# Patient Record
Sex: MALE | Race: WHITE | NOT HISPANIC OR LATINO | Employment: FULL TIME | URBAN - METROPOLITAN AREA
[De-identification: names, ages, dates, MRNs, and addresses within clinical notes are randomized per-mention and may not be internally consistent; named-entity substitution may affect disease eponyms.]

---

## 2021-07-01 ENCOUNTER — PATIENT MESSAGE (OUTPATIENT)
Dept: ADMINISTRATIVE | Facility: OTHER | Age: 31
End: 2021-07-01

## 2021-07-06 ENCOUNTER — OFFICE VISIT (OUTPATIENT)
Dept: PRIMARY CARE CLINIC | Facility: CLINIC | Age: 31
End: 2021-07-06
Payer: COMMERCIAL

## 2021-07-06 VITALS
WEIGHT: 190.69 LBS | OXYGEN SATURATION: 97 % | HEIGHT: 66 IN | SYSTOLIC BLOOD PRESSURE: 123 MMHG | RESPIRATION RATE: 18 BRPM | BODY MASS INDEX: 30.65 KG/M2 | DIASTOLIC BLOOD PRESSURE: 80 MMHG | HEART RATE: 96 BPM

## 2021-07-06 DIAGNOSIS — Z00.00 NORMAL PHYSICAL EXAM, ROUTINE: Primary | ICD-10-CM

## 2021-07-06 DIAGNOSIS — Z13.220 SCREENING FOR LIPOID DISORDERS: ICD-10-CM

## 2021-07-06 DIAGNOSIS — K21.9 GASTROESOPHAGEAL REFLUX DISEASE, UNSPECIFIED WHETHER ESOPHAGITIS PRESENT: ICD-10-CM

## 2021-07-06 PROCEDURE — 99395 PR PREVENTIVE VISIT,EST,18-39: ICD-10-PCS | Mod: S$GLB,,, | Performed by: INTERNAL MEDICINE

## 2021-07-06 PROCEDURE — 99999 PR PBB SHADOW E&M-EST. PATIENT-LVL III: CPT | Mod: PBBFAC,,, | Performed by: INTERNAL MEDICINE

## 2021-07-06 PROCEDURE — 1126F AMNT PAIN NOTED NONE PRSNT: CPT | Mod: S$GLB,,, | Performed by: INTERNAL MEDICINE

## 2021-07-06 PROCEDURE — 3008F PR BODY MASS INDEX (BMI) DOCUMENTED: ICD-10-PCS | Mod: CPTII,S$GLB,, | Performed by: INTERNAL MEDICINE

## 2021-07-06 PROCEDURE — 3008F BODY MASS INDEX DOCD: CPT | Mod: CPTII,S$GLB,, | Performed by: INTERNAL MEDICINE

## 2021-07-06 PROCEDURE — 99395 PREV VISIT EST AGE 18-39: CPT | Mod: S$GLB,,, | Performed by: INTERNAL MEDICINE

## 2021-07-06 PROCEDURE — 99999 PR PBB SHADOW E&M-EST. PATIENT-LVL III: ICD-10-PCS | Mod: PBBFAC,,, | Performed by: INTERNAL MEDICINE

## 2021-07-06 PROCEDURE — 1126F PR PAIN SEVERITY QUANTIFIED, NO PAIN PRESENT: ICD-10-PCS | Mod: S$GLB,,, | Performed by: INTERNAL MEDICINE

## 2021-07-06 RX ORDER — OMEPRAZOLE 20 MG/1
20 CAPSULE, DELAYED RELEASE ORAL DAILY
Qty: 30 CAPSULE | Refills: 0 | Status: SHIPPED | OUTPATIENT
Start: 2021-07-06 | End: 2021-08-05

## 2021-07-09 ENCOUNTER — IMMUNIZATION (OUTPATIENT)
Dept: INTERNAL MEDICINE | Facility: CLINIC | Age: 31
End: 2021-07-09
Payer: COMMERCIAL

## 2021-07-09 DIAGNOSIS — Z23 NEED FOR VACCINATION: Primary | ICD-10-CM

## 2021-07-09 PROCEDURE — 0031A COVID-19,VECTOR-NR,RS-AD26,PF,0.5 ML DOSE VACCINE (JANSSEN): CPT | Mod: PBBFAC | Performed by: INTERNAL MEDICINE

## 2021-11-14 ENCOUNTER — PATIENT MESSAGE (OUTPATIENT)
Dept: PRIMARY CARE CLINIC | Facility: CLINIC | Age: 31
End: 2021-11-14
Payer: COMMERCIAL

## 2021-11-15 ENCOUNTER — OFFICE VISIT (OUTPATIENT)
Dept: PRIMARY CARE CLINIC | Facility: CLINIC | Age: 31
End: 2021-11-15
Payer: COMMERCIAL

## 2021-11-15 VITALS
SYSTOLIC BLOOD PRESSURE: 118 MMHG | WEIGHT: 196.44 LBS | HEART RATE: 75 BPM | HEIGHT: 66 IN | BODY MASS INDEX: 31.57 KG/M2 | TEMPERATURE: 98 F | RESPIRATION RATE: 18 BRPM | OXYGEN SATURATION: 98 % | DIASTOLIC BLOOD PRESSURE: 70 MMHG

## 2021-11-15 DIAGNOSIS — J00 RHINOPHARYNGITIS: Primary | ICD-10-CM

## 2021-11-15 PROCEDURE — 99213 OFFICE O/P EST LOW 20 MIN: CPT | Mod: S$GLB,,, | Performed by: INTERNAL MEDICINE

## 2021-11-15 PROCEDURE — 1159F PR MEDICATION LIST DOCUMENTED IN MEDICAL RECORD: ICD-10-PCS | Mod: CPTII,S$GLB,, | Performed by: INTERNAL MEDICINE

## 2021-11-15 PROCEDURE — 3078F DIAST BP <80 MM HG: CPT | Mod: CPTII,S$GLB,, | Performed by: INTERNAL MEDICINE

## 2021-11-15 PROCEDURE — 99999 PR PBB SHADOW E&M-EST. PATIENT-LVL III: ICD-10-PCS | Mod: PBBFAC,,, | Performed by: INTERNAL MEDICINE

## 2021-11-15 PROCEDURE — 3078F PR MOST RECENT DIASTOLIC BLOOD PRESSURE < 80 MM HG: ICD-10-PCS | Mod: CPTII,S$GLB,, | Performed by: INTERNAL MEDICINE

## 2021-11-15 PROCEDURE — 99213 PR OFFICE/OUTPT VISIT, EST, LEVL III, 20-29 MIN: ICD-10-PCS | Mod: S$GLB,,, | Performed by: INTERNAL MEDICINE

## 2021-11-15 PROCEDURE — 3074F SYST BP LT 130 MM HG: CPT | Mod: CPTII,S$GLB,, | Performed by: INTERNAL MEDICINE

## 2021-11-15 PROCEDURE — 1159F MED LIST DOCD IN RCRD: CPT | Mod: CPTII,S$GLB,, | Performed by: INTERNAL MEDICINE

## 2021-11-15 PROCEDURE — 3008F PR BODY MASS INDEX (BMI) DOCUMENTED: ICD-10-PCS | Mod: CPTII,S$GLB,, | Performed by: INTERNAL MEDICINE

## 2021-11-15 PROCEDURE — 3074F PR MOST RECENT SYSTOLIC BLOOD PRESSURE < 130 MM HG: ICD-10-PCS | Mod: CPTII,S$GLB,, | Performed by: INTERNAL MEDICINE

## 2021-11-15 PROCEDURE — 99999 PR PBB SHADOW E&M-EST. PATIENT-LVL III: CPT | Mod: PBBFAC,,, | Performed by: INTERNAL MEDICINE

## 2021-11-15 PROCEDURE — 3008F BODY MASS INDEX DOCD: CPT | Mod: CPTII,S$GLB,, | Performed by: INTERNAL MEDICINE

## 2022-07-07 ENCOUNTER — TELEPHONE (OUTPATIENT)
Dept: PRIMARY CARE CLINIC | Facility: CLINIC | Age: 32
End: 2022-07-07

## 2022-07-07 ENCOUNTER — PATIENT MESSAGE (OUTPATIENT)
Dept: PRIMARY CARE CLINIC | Facility: CLINIC | Age: 32
End: 2022-07-07
Payer: COMMERCIAL

## 2022-07-07 NOTE — TELEPHONE ENCOUNTER
Appointment Request From: Faheem Shankar      With Provider: Yarely Echavarria MD [[facility] - Primary Care]      Preferred Date Range: 7/12/2022 - 7/12/2022      Preferred Times: Any Time      Reason for visit: Last week I passed out for around 20 minutes and have not felt well since. I have been for a COVID test today and it was negative.      Comments:   Possible reasons for passing out.      Responded to pt to get more info and to find out if he went to ED after episode

## 2022-07-07 NOTE — TELEPHONE ENCOUNTER
----- Message from Adela Gee sent at 7/7/2022 12:51 PM CDT -----  Contact: pt [phone]  Patient was discharged from ED 7/7/22. ED suggested orders for Holter monitor and or ECHO. Please call and advise.    Thank you and have a great day.

## 2022-07-07 NOTE — TELEPHONE ENCOUNTER
lov 11/15/21  You do have one opening tomorrow but I am unsure if you would like to see pt or think he needs to go to ED for eval

## 2022-07-07 NOTE — TELEPHONE ENCOUNTER
"Ed note states"  He was discharged and instructed to follow-up with his primary care physician to have them schedule a Holter monitor and/or an echocardiogram"   Ed f/u appt scheduled for Monday afternoon  "

## 2022-07-11 ENCOUNTER — HOSPITAL ENCOUNTER (OUTPATIENT)
Dept: RADIOLOGY | Facility: HOSPITAL | Age: 32
Discharge: HOME | End: 2022-07-11
Attending: INTERNAL MEDICINE
Payer: COMMERCIAL

## 2022-07-11 ENCOUNTER — OFFICE VISIT (OUTPATIENT)
Dept: PRIMARY CARE CLINIC | Facility: CLINIC | Age: 32
End: 2022-07-11
Payer: COMMERCIAL

## 2022-07-11 VITALS
SYSTOLIC BLOOD PRESSURE: 100 MMHG | BODY MASS INDEX: 30.45 KG/M2 | HEIGHT: 67 IN | WEIGHT: 194 LBS | OXYGEN SATURATION: 98 % | TEMPERATURE: 99 F | HEART RATE: 76 BPM | DIASTOLIC BLOOD PRESSURE: 68 MMHG | RESPIRATION RATE: 16 BRPM

## 2022-07-11 DIAGNOSIS — D50.9 MICROCYTIC ANEMIA: ICD-10-CM

## 2022-07-11 DIAGNOSIS — F41.9 ANXIETY: ICD-10-CM

## 2022-07-11 DIAGNOSIS — R55 SYNCOPE, UNSPECIFIED SYNCOPE TYPE: ICD-10-CM

## 2022-07-11 DIAGNOSIS — R06.09 DOE (DYSPNEA ON EXERTION): Primary | ICD-10-CM

## 2022-07-11 DIAGNOSIS — R73.9 HYPERGLYCEMIA: ICD-10-CM

## 2022-07-11 DIAGNOSIS — R06.09 DOE (DYSPNEA ON EXERTION): ICD-10-CM

## 2022-07-11 DIAGNOSIS — K21.9 GASTROESOPHAGEAL REFLUX DISEASE, UNSPECIFIED WHETHER ESOPHAGITIS PRESENT: ICD-10-CM

## 2022-07-11 PROCEDURE — 3078F PR MOST RECENT DIASTOLIC BLOOD PRESSURE < 80 MM HG: ICD-10-PCS | Mod: CPTII,S$GLB,, | Performed by: INTERNAL MEDICINE

## 2022-07-11 PROCEDURE — 3008F BODY MASS INDEX DOCD: CPT | Mod: CPTII,S$GLB,, | Performed by: INTERNAL MEDICINE

## 2022-07-11 PROCEDURE — 3074F SYST BP LT 130 MM HG: CPT | Mod: CPTII,S$GLB,, | Performed by: INTERNAL MEDICINE

## 2022-07-11 PROCEDURE — 1160F RVW MEDS BY RX/DR IN RCRD: CPT | Mod: CPTII,S$GLB,, | Performed by: INTERNAL MEDICINE

## 2022-07-11 PROCEDURE — 1160F PR REVIEW ALL MEDS BY PRESCRIBER/CLIN PHARMACIST DOCUMENTED: ICD-10-PCS | Mod: CPTII,S$GLB,, | Performed by: INTERNAL MEDICINE

## 2022-07-11 PROCEDURE — 71046 X-RAY EXAM CHEST 2 VIEWS: CPT | Mod: TC,PN

## 2022-07-11 PROCEDURE — 99214 PR OFFICE/OUTPT VISIT, EST, LEVL IV, 30-39 MIN: ICD-10-PCS | Mod: S$GLB,,, | Performed by: INTERNAL MEDICINE

## 2022-07-11 PROCEDURE — 1159F MED LIST DOCD IN RCRD: CPT | Mod: CPTII,S$GLB,, | Performed by: INTERNAL MEDICINE

## 2022-07-11 PROCEDURE — 99999 PR PBB SHADOW E&M-EST. PATIENT-LVL IV: CPT | Mod: PBBFAC,,, | Performed by: INTERNAL MEDICINE

## 2022-07-11 PROCEDURE — 71046 XR CHEST PA AND LATERAL: ICD-10-PCS | Mod: 26,,, | Performed by: RADIOLOGY

## 2022-07-11 PROCEDURE — 99214 OFFICE O/P EST MOD 30 MIN: CPT | Mod: S$GLB,,, | Performed by: INTERNAL MEDICINE

## 2022-07-11 PROCEDURE — 99999 PR PBB SHADOW E&M-EST. PATIENT-LVL IV: ICD-10-PCS | Mod: PBBFAC,,, | Performed by: INTERNAL MEDICINE

## 2022-07-11 PROCEDURE — 3008F PR BODY MASS INDEX (BMI) DOCUMENTED: ICD-10-PCS | Mod: CPTII,S$GLB,, | Performed by: INTERNAL MEDICINE

## 2022-07-11 PROCEDURE — 71046 X-RAY EXAM CHEST 2 VIEWS: CPT | Mod: 26,,, | Performed by: RADIOLOGY

## 2022-07-11 PROCEDURE — 1159F PR MEDICATION LIST DOCUMENTED IN MEDICAL RECORD: ICD-10-PCS | Mod: CPTII,S$GLB,, | Performed by: INTERNAL MEDICINE

## 2022-07-11 PROCEDURE — 3078F DIAST BP <80 MM HG: CPT | Mod: CPTII,S$GLB,, | Performed by: INTERNAL MEDICINE

## 2022-07-11 PROCEDURE — 3074F PR MOST RECENT SYSTOLIC BLOOD PRESSURE < 130 MM HG: ICD-10-PCS | Mod: CPTII,S$GLB,, | Performed by: INTERNAL MEDICINE

## 2022-07-11 NOTE — PROGRESS NOTES
[facility] Note    Chief Complaint      Chief Complaint   Patient presents with    Follow-up     Er visit. [facility]    Shortness of Breath       History of Present Illness      Faheem Shankar is a 31 y.o. M with GERD presents to fu ED visit s/p syncopal episode.   Last visit - 11/15/21    Syncope - He tested neg for COVID at  on 7/6/22. He c/o feeling weak/tired, had a HA, hot and cold, and had an episode of nausea and emesis.  He then went into his barn to feed the horses for approx 10-15mins when he became nauseated and vomited then had a syncopal episode on 6/30/22.  He states that he was on the ground for approximately 15 minutes. Seen in ED -7/7/22 -  He received IVF and fioricet for HA.   He was not orthostatic. He was discharged and instructed to follow-up with his primary care physician to have them schedule a Holter monitor and/or an echocardiogram.  He gets tired and ESTRADA.+snores. No known apnea.      Anxiety/Depression - Work has been stressful. He works in insurance. He plans to fu with a therapist. He goes to the gym 2/wk x 45-60 minutes. He does the treadmill without issue. He has not been since 3 wks ago.     Microcytic anemia - H/H  -12.6/39.8. Pt has thalassemia in family.  Will check iron studies.     GERD - Up and down. He takes Omeprazole prn.     Hyperglycemia - Will check HA1c.     HCM - Flu - none;  Tdap - '15?;  PCV 13 - none;  PVX 23 - none;  Gardasil - none;  COVID - 19 Vaccine (J&J) #1 7/9/21;  Hep C Screen - none - defers;  HIV Screen - none - defers; C-scope - none;  PSA - none; Prev PCP - me at [facility]    Patient Care Team:  Yarely Echavarria MD as PCP - General (Internal Medicine)     Health Maintenance:  Immunization History   Administered Date(s) Administered    COVID-19, vector-nr, rS-Ad26, PF ([place]) 07/09/2021      Health Maintenance   Topic Date Due    Hepatitis C Screening  Never done    Lipid Panel  Never done    TETANUS VACCINE  Never done         Past Medical History:  Past Medical History:   Diagnosis Date    COVID-19 02/2021       Past Surgical History:   has a past surgical history that includes OTHER SURGICAL HISTORY (Right).    Family History:  family history includes Depression in his mother; Diabetes in his maternal grandmother; Diverticulitis in his father; Heart attack (age of onset: 76) in his maternal grandmother; OCD in his mother; Thalassemia in his maternal grandmother, mother, paternal grandmother, and sister.     Social History:  Social History     Tobacco Use    Smoking status: Never Smoker    Smokeless tobacco: Never Used   Substance Use Topics    Alcohol use: Never    Drug use: Never       Review of Systems   Constitutional: Positive for fatigue. Negative for chills, diaphoresis and fever.   HENT: Positive for nasal congestion. Negative for rhinorrhea and sore throat.    Respiratory: Positive for cough and shortness of breath. Negative for chest tightness.         Cough x 8 days - sometimes brings up yellow sputum.    Cardiovascular: Negative for chest pain.   Gastrointestinal: Positive for abdominal pain and diarrhea. Negative for blood in stool, constipation, nausea and vomiting.   Endocrine: Negative for cold intolerance and heat intolerance.   Genitourinary: Negative for difficulty urinating, dysuria and hematuria.   Musculoskeletal: Negative for arthralgias and myalgias.   Neurological: Positive for headaches. Negative for dizziness.        Above left eye - comes and goes.    Psychiatric/Behavioral: Negative for dysphoric mood. The patient is nervous/anxious.         Medications:    Current Outpatient Medications:     multivitamin capsule, Take 1 capsule by mouth once daily., Disp: 1 capsule, Rfl: 0    omeprazole (PRILOSEC) 20 MG capsule, TAKE 1 CAPSULE(20 MG) BY MOUTH EVERY DAY, Disp: 30 capsule, Rfl: 0     Allergies:  Review of patient's allergies indicates:  No Known Allergies    Physical Exam      Vital Signs  Temp: 99  "°F (37.2 °C)  Pulse: 76  Resp: 16  SpO2: 98 %  BP: 100/68  BP Location: Right arm  Pain Score:   1  Height and Weight  Height: 5' 7" (170.2 cm)  Weight: 88 kg (194 lb 0.1 oz)  BSA (Calculated - sq m): 2.04 sq meters  BMI (Calculated): 30.4  Weight in (lb) to have BMI = 25: 159.3             Physical Exam  Vitals reviewed.   Constitutional:       General: He is not in acute distress.     Appearance: Normal appearance. He is not ill-appearing, toxic-appearing or diaphoretic.   HENT:      Head: Normocephalic and atraumatic.      Right Ear: Tympanic membrane normal.      Left Ear: Tympanic membrane normal.   Eyes:      Extraocular Movements: Extraocular movements intact.      Conjunctiva/sclera: Conjunctivae normal.      Pupils: Pupils are equal, round, and reactive to light.   Neck:      Vascular: No carotid bruit.   Cardiovascular:      Rate and Rhythm: Normal rate and regular rhythm.      Pulses: Normal pulses.      Heart sounds: Normal heart sounds. No murmur heard.  Pulmonary:      Effort: No respiratory distress.      Breath sounds: Normal breath sounds. No wheezing.   Abdominal:      General: Bowel sounds are normal. There is no distension.      Palpations: Abdomen is soft.      Tenderness: There is no abdominal tenderness. There is no guarding or rebound.   Musculoskeletal:         General: Normal range of motion.   Skin:     General: Skin is warm and dry.   Neurological:      General: No focal deficit present.      Mental Status: He is alert and oriented to person, place, and time.   Psychiatric:         Mood and Affect: Mood normal.         Behavior: Behavior normal.          Laboratory:  CBC:  Recent Labs   Lab 07/07/22  0938   WBC 8.39   RBC 6.64 H   Hemoglobin 12.6 L   Hematocrit 39.8 L   Platelets 184   MCV 60 L   MCH 19.0 L   MCHC 31.7 L       CMP:  Recent Labs   Lab 07/07/22  0938   Glucose 119 H   Calcium 9.4   Albumin 4.9   Total Protein 8.0   Sodium 140   Potassium 3.7   CO2 29   Chloride 103   BUN 18 "   Creatinine 0.74   Alkaline Phosphatase 57   ALT 30   AST 33   Total Bilirubin 0.9       URINALYSIS:  Recent Labs   Lab 07/07/22  1017   Color, UA Yellow   Specific Gravity, UA >=1.030 A   pH, UA 6.5   Protein, UA 1+ A   Bacteria Negative  Negative   Nitrite, UA Negative   Leukocytes, UA Negative   Urobilinogen, UA 1.0   Hyaline Casts, UA 1  1          Assessment/Plan     Faheem Shankar is a 31 y.o.male with:    ESTRADA (dyspnea on exertion)  -     Echo; Future  -     X-Ray Chest PA And Lateral; Future; Expected date: 07/11/2022  - Check CXR - wnl.  Will check Echo.     Syncope, unspecified syncope type  -     Holter monitor - 48 hour; Future  - He will alert me for any further episodes.  Will check Echo and Holter.     Anxiety  - He plans to fu with a therapist. Pt not interested in pharmacotherapy at this time.     Microcytic anemia  -     Ferritin; Future; Expected date: 07/11/2022  -     Iron and TIBC; Future; Expected date: 07/11/2022  - Suspect thalassemia.  Will check iron studies.     Gastroesophageal reflux disease, unspecified whether esophagitis present  - Cont prn PPI, and reflux prec.     Hyperglycemia  -     Hemoglobin A1C; Future; Expected date: 07/11/2022  - Check HA1c.        Chronic conditions status updated as per HPI.  Other than changes above, cont current medications and maintain follow up with specialists.   Follow up in about 6 weeks (around 8/22/2022) for fu chronic issues or sooner if needed.      Yarely Echavarria MD  [facility]

## 2022-07-12 ENCOUNTER — TELEPHONE (OUTPATIENT)
Dept: PRIMARY CARE CLINIC | Facility: CLINIC | Age: 32
End: 2022-07-12
Payer: COMMERCIAL

## 2022-07-12 DIAGNOSIS — D50.9 MICROCYTIC ANEMIA: ICD-10-CM

## 2022-07-12 DIAGNOSIS — E61.1 IRON DEFICIENCY: Primary | ICD-10-CM

## 2022-07-12 NOTE — TELEPHONE ENCOUNTER
He can if he wants since it is borderline. Otherwise, that is a very low of dose of iron and should not cause any concerning issues other than possible constipation     Dr. COLBERT

## 2022-07-12 NOTE — TELEPHONE ENCOUNTER
I sw pt. No blood or black tarry stools. Pt states his mother has  thalassemia and she was told to take folic acid and not an iron supplement. Pt would like to know if he should still just take the slow fe for now until we confirm  thalassemia with his labs in 8 wks

## 2022-07-12 NOTE — TELEPHONE ENCOUNTER
----- Message from Yarely Echavarria MD sent at 7/12/2022  6:43 AM CDT -----  Please inform pt -  I reviewed your labs.  Your ferritin was normal but Your % saturation was just below normal at 19% which means you may be slightly iron deficient.  I recommend you start an otc iron supplement - slow FE OTC 3 times.wk and we can repeat this level in 8 wks.  I will also order a test to check you for thalassemia.  Please make sure this is not an unexpected expense for you when you go to get it. If it is too expensive to get the thalassemia test we can hold off. Please note that iron supplements can cause constipation and darker stools.  Please alert me if you have any issues tolerating this medication. Have you noticed any blood in your stool or black tarry stool? No further recommendations at this time.    Dr. COLBERT

## 2022-07-29 ENCOUNTER — HOSPITAL ENCOUNTER (OUTPATIENT)
Dept: CARDIOLOGY | Facility: HOSPITAL | Age: 32
Discharge: HOME | End: 2022-07-29
Attending: INTERNAL MEDICINE
Payer: COMMERCIAL

## 2022-07-29 VITALS
HEART RATE: 75 BPM | BODY MASS INDEX: 30.45 KG/M2 | HEIGHT: 67 IN | DIASTOLIC BLOOD PRESSURE: 70 MMHG | SYSTOLIC BLOOD PRESSURE: 110 MMHG | WEIGHT: 194 LBS

## 2022-07-29 DIAGNOSIS — R55 SYNCOPE, UNSPECIFIED SYNCOPE TYPE: ICD-10-CM

## 2022-07-29 DIAGNOSIS — R06.09 DOE (DYSPNEA ON EXERTION): ICD-10-CM

## 2022-07-29 LAB
ASCENDING AORTA: 2.94 CM
AV INDEX (PROSTH): 0.75
AV MEAN GRADIENT: 4 MMHG
AV PEAK GRADIENT: 6 MMHG
AV VALVE AREA: 3.5 CM2
AV VELOCITY RATIO: 0.72
BSA FOR ECHO PROCEDURE: 2.04 M2
CV ECHO LV RWT: 0.3 CM
DOP CALC AO PEAK VEL: 1.27 M/S
DOP CALC AO VTI: 24.31 CM
DOP CALC LVOT AREA: 4.7 CM2
DOP CALC LVOT DIAMETER: 2.44 CM
DOP CALC LVOT PEAK VEL: 0.92 M/S
DOP CALC LVOT STROKE VOLUME: 85.15 CM3
DOP CALCLVOT PEAK VEL VTI: 18.22 CM
E WAVE DECELERATION TIME: 182.55 MSEC
E/A RATIO: 1.16
E/E' RATIO: 6.95 M/S
ECHO LV POSTERIOR WALL: 0.76 CM (ref 0.6–1.1)
EJECTION FRACTION: 60 %
FRACTIONAL SHORTENING: 39 % (ref 28–44)
INTERVENTRICULAR SEPTUM: 0.87 CM (ref 0.6–1.1)
IVRT: 91.34 MSEC
LA MAJOR: 4.77 CM
LA MINOR: 4.9 CM
LA WIDTH: 3.81 CM
LEFT ATRIUM SIZE: 3.36 CM
LEFT ATRIUM VOLUME INDEX MOD: 23.4 ML/M2
LEFT ATRIUM VOLUME INDEX: 26.3 ML/M2
LEFT ATRIUM VOLUME MOD: 46.78 CM3
LEFT ATRIUM VOLUME: 52.6 CM3
LEFT INTERNAL DIMENSION IN SYSTOLE: 3.08 CM (ref 2.1–4)
LEFT VENTRICLE DIASTOLIC VOLUME INDEX: 61.57 ML/M2
LEFT VENTRICLE DIASTOLIC VOLUME: 123.13 ML
LEFT VENTRICLE MASS INDEX: 72 G/M2
LEFT VENTRICLE SYSTOLIC VOLUME INDEX: 18.6 ML/M2
LEFT VENTRICLE SYSTOLIC VOLUME: 37.2 ML
LEFT VENTRICULAR INTERNAL DIMENSION IN DIASTOLE: 5.09 CM (ref 3.5–6)
LEFT VENTRICULAR MASS: 143.36 G
LV LATERAL E/E' RATIO: 5.21 M/S
LV SEPTAL E/E' RATIO: 10.43 M/S
MV PEAK A VEL: 0.63 M/S
MV PEAK E VEL: 0.73 M/S
MV STENOSIS PRESSURE HALF TIME: 52.94 MS
MV VALVE AREA P 1/2 METHOD: 4.16 CM2
PISA TR MAX VEL: 2.48 M/S
PULM VEIN S/D RATIO: 1.1
PV PEAK D VEL: 0.5 M/S
PV PEAK S VEL: 0.55 M/S
RA MAJOR: 4.74 CM
RA PRESSURE: 3 MMHG
RA WIDTH: 2.87 CM
RIGHT VENTRICULAR END-DIASTOLIC DIMENSION: 3.39 CM
SINUS: 3.14 CM
STJ: 2.77 CM
TDI LATERAL: 0.14 M/S
TDI SEPTAL: 0.07 M/S
TDI: 0.11 M/S
TR MAX PG: 25 MMHG
TRICUSPID ANNULAR PLANE SYSTOLIC EXCURSION: 1.88 CM
TV REST PULMONARY ARTERY PRESSURE: 28 MMHG

## 2022-07-29 PROCEDURE — 93306 TTE W/DOPPLER COMPLETE: CPT

## 2022-07-29 PROCEDURE — 93226 XTRNL ECG REC<48 HR SCAN A/R: CPT

## 2022-07-29 PROCEDURE — 93306 ECHO (CUPID ONLY): ICD-10-PCS | Mod: 26,,, | Performed by: INTERNAL MEDICINE

## 2022-07-29 PROCEDURE — 93227 HOLTER MONITOR - 48 HOUR (CUPID ONLY): ICD-10-PCS | Mod: ,,, | Performed by: INTERNAL MEDICINE

## 2022-07-29 PROCEDURE — 93227 XTRNL ECG REC<48 HR R&I: CPT | Mod: ,,, | Performed by: INTERNAL MEDICINE

## 2022-07-29 PROCEDURE — 93306 TTE W/DOPPLER COMPLETE: CPT | Mod: 26,,, | Performed by: INTERNAL MEDICINE

## 2022-08-01 LAB
OHS CV EVENT MONITOR DAY: 0
OHS CV HOLTER LENGTH DECIMAL HOURS: 48
OHS CV HOLTER LENGTH HOURS: 48
OHS CV HOLTER LENGTH MINUTES: 0
OHS CV HOLTER SINUS AVERAGE HR: 83
OHS CV HOLTER SINUS MAX HR: 133
OHS CV HOLTER SINUS MIN HR: 50

## 2022-08-22 ENCOUNTER — OFFICE VISIT (OUTPATIENT)
Dept: PRIMARY CARE CLINIC | Facility: CLINIC | Age: 32
End: 2022-08-22
Payer: COMMERCIAL

## 2022-08-22 ENCOUNTER — TELEPHONE (OUTPATIENT)
Dept: BEHAVIORAL HEALTH | Facility: CLINIC | Age: 32
End: 2022-08-22
Payer: COMMERCIAL

## 2022-08-22 ENCOUNTER — PATIENT MESSAGE (OUTPATIENT)
Dept: BEHAVIORAL HEALTH | Facility: CLINIC | Age: 32
End: 2022-08-22
Payer: COMMERCIAL

## 2022-08-22 VITALS
HEIGHT: 67 IN | DIASTOLIC BLOOD PRESSURE: 76 MMHG | WEIGHT: 195.75 LBS | RESPIRATION RATE: 16 BRPM | HEART RATE: 86 BPM | BODY MASS INDEX: 30.72 KG/M2 | TEMPERATURE: 98 F | OXYGEN SATURATION: 96 % | SYSTOLIC BLOOD PRESSURE: 110 MMHG

## 2022-08-22 DIAGNOSIS — F41.9 ANXIETY: ICD-10-CM

## 2022-08-22 DIAGNOSIS — Z00.00 NORMAL PHYSICAL EXAM, ROUTINE: Primary | ICD-10-CM

## 2022-08-22 DIAGNOSIS — D50.9 MICROCYTIC ANEMIA: ICD-10-CM

## 2022-08-22 DIAGNOSIS — K21.9 GASTROESOPHAGEAL REFLUX DISEASE, UNSPECIFIED WHETHER ESOPHAGITIS PRESENT: ICD-10-CM

## 2022-08-22 PROCEDURE — 3008F BODY MASS INDEX DOCD: CPT | Mod: CPTII,S$GLB,, | Performed by: INTERNAL MEDICINE

## 2022-08-22 PROCEDURE — 3074F SYST BP LT 130 MM HG: CPT | Mod: CPTII,S$GLB,, | Performed by: INTERNAL MEDICINE

## 2022-08-22 PROCEDURE — 3008F PR BODY MASS INDEX (BMI) DOCUMENTED: ICD-10-PCS | Mod: CPTII,S$GLB,, | Performed by: INTERNAL MEDICINE

## 2022-08-22 PROCEDURE — 3044F HG A1C LEVEL LT 7.0%: CPT | Mod: CPTII,S$GLB,, | Performed by: INTERNAL MEDICINE

## 2022-08-22 PROCEDURE — 3044F PR MOST RECENT HEMOGLOBIN A1C LEVEL <7.0%: ICD-10-PCS | Mod: CPTII,S$GLB,, | Performed by: INTERNAL MEDICINE

## 2022-08-22 PROCEDURE — 99395 PR PREVENTIVE VISIT,EST,18-39: ICD-10-PCS | Mod: S$GLB,,, | Performed by: INTERNAL MEDICINE

## 2022-08-22 PROCEDURE — 3078F DIAST BP <80 MM HG: CPT | Mod: CPTII,S$GLB,, | Performed by: INTERNAL MEDICINE

## 2022-08-22 PROCEDURE — 3078F PR MOST RECENT DIASTOLIC BLOOD PRESSURE < 80 MM HG: ICD-10-PCS | Mod: CPTII,S$GLB,, | Performed by: INTERNAL MEDICINE

## 2022-08-22 PROCEDURE — 99999 PR PBB SHADOW E&M-EST. PATIENT-LVL IV: CPT | Mod: PBBFAC,,, | Performed by: INTERNAL MEDICINE

## 2022-08-22 PROCEDURE — 99999 PR PBB SHADOW E&M-EST. PATIENT-LVL IV: ICD-10-PCS | Mod: PBBFAC,,, | Performed by: INTERNAL MEDICINE

## 2022-08-22 PROCEDURE — 1159F PR MEDICATION LIST DOCUMENTED IN MEDICAL RECORD: ICD-10-PCS | Mod: CPTII,S$GLB,, | Performed by: INTERNAL MEDICINE

## 2022-08-22 PROCEDURE — 1159F MED LIST DOCD IN RCRD: CPT | Mod: CPTII,S$GLB,, | Performed by: INTERNAL MEDICINE

## 2022-08-22 PROCEDURE — 3074F PR MOST RECENT SYSTOLIC BLOOD PRESSURE < 130 MM HG: ICD-10-PCS | Mod: CPTII,S$GLB,, | Performed by: INTERNAL MEDICINE

## 2022-08-22 PROCEDURE — 99395 PREV VISIT EST AGE 18-39: CPT | Mod: S$GLB,,, | Performed by: INTERNAL MEDICINE

## 2022-08-22 NOTE — PROGRESS NOTES
[facility] Note    Chief Complaint      Chief Complaint   Patient presents with    Follow-up       History of Present Illness      Faheem Shankar is a 31 y.o.  M with GERD presents to fu well visit.   Last visit - 7/11/22    Syncope - He tested neg for COVID at  on 7/6/22. He c/o feeling weak/tired, had a HA, hot and cold, and had an episode of nausea and emesis.  He then went into his barn to feed the horses for approx 10-15mins when he became nauseated and vomited then had a syncopal episode on 6/30/22.  He states that he was on the ground for approximately 15 minutes. Seen in ED -7/7/22 -  He received IVF and fioricet for HA.   He was not orthostatic. He gets tired and ESTRADA.+snores. No known apnea.  Echo - 7/29/22 - EF - 60%; PAP - 28 mmHG. Holter - 7/29/22- NSR. CXR - 7/11/22 - neg.     Anxiety/Depression - Work has been stressful. He works in [employer]. He plans to fu with a therapist. He goes to the gym 2/wk x 45-60 minutes. He does the treadmill without issue. He has not been since 3 wks ago.      Microcytic anemia - H/H  -12.6/39.8. Pt has thalassemia in family.  Ferritin was normal but % saturation was just below normal at 19% which means you may be due to slight iron deficiency. He has been taking an otc iron supplement - slow FE OTC 3 times/wk and is sched to repeat this level in 8 wks.  I will also order a test to check you for thalassemia.  Please make sure this is not an unexpected expense for you when you go to get it. If it is too expensive to get the thalassemia test we can hold off. Please note that iron supplements can cause constipation and darker stools.  Please alert me if you have any issues tolerating this medication.  No blood or black tarry stools. Pt states his mother has  thalassemia      GERD - Up and down. He takes Omeprazole prn. Rec avoid triggers if known such as spicy foods, fried foods, caffeine.  Rec not eating late at night ~ 2 hrs prior to bedtime.  Rec keep  head of bed elevated.        Hyperglycemia -  HA1c - 5.1 - wnl.      HCM - Flu - none;  Tdap - Due- will get from pharm;  PCV 13 - none;  PVX 23 - none;  Gardasil - none;  COVID - 19 Vaccine (J&J) #1 7/9/21;  Hep C Screen - none - defers;  HIV Screen - none - defers; C-scope - none- due at 44 y.o.;  PSA - none; Prev PCP - me at [facility]       Patient Care Team:  Yarely Echavarria MD as PCP - General (Internal Medicine)     Health Maintenance:  Immunization History   Administered Date(s) Administered    COVID-19, vector-nr, rS-Ad26, PF ([place]) 07/09/2021    Hepatitis B, Pediatric/Adolescent 03/17/2000, 06/23/2004, 04/04/2008    Meningococcal Conjugate (MCV4P) 04/04/2008    Td (ADULT) 06/23/2004    Tdap 04/04/2008    Varicella 05/08/1997, 04/04/2008      Health Maintenance   Topic Date Due    Hepatitis C Screening  Never done    Lipid Panel  Never done    TETANUS VACCINE  04/04/2018        Past Medical History:  Past Medical History:   Diagnosis Date    COVID-19 02/2021       Past Surgical History:   has a past surgical history that includes OTHER SURGICAL HISTORY (Right).    Family History:  family history includes Depression in his mother; Diabetes in his maternal grandmother; Diverticulitis in his father; Heart attack (age of onset: 76) in his maternal grandmother; OCD in his mother; Thalassemia in his maternal grandmother, mother, paternal grandmother, and sister.     Social History:  Social History     Tobacco Use    Smoking status: Never Smoker    Smokeless tobacco: Never Used   Substance Use Topics    Alcohol use: Never    Drug use: Never       Review of Systems   Constitutional: Negative for chills, diaphoresis and fever.   HENT: Positive for nasal congestion. Negative for postnasal drip and rhinorrhea.    Eyes: Negative for discharge and itching.   Respiratory: Positive for cough. Negative for chest tightness, shortness of breath and wheezing.         Chronic cough comes and goes.  "  Cardiovascular: Negative for chest pain.   Gastrointestinal: Positive for abdominal pain. Negative for constipation, diarrhea, nausea and vomiting.        Only on the day after taking his iron.    Genitourinary: Negative for hematuria.   Neurological: Positive for headaches. Negative for dizziness.        1/wk - takes excedrine prn which helps   Psychiatric/Behavioral: The patient is nervous/anxious.         Medications:    Current Outpatient Medications:     ferrous sulfate (SLOW FE ORAL), Take by mouth. Take one tablet 3 times per week, Disp: , Rfl:     multivitamin capsule, Take 1 capsule by mouth once daily., Disp: 1 capsule, Rfl: 0    omeprazole (PRILOSEC) 20 MG capsule, TAKE 1 CAPSULE(20 MG) BY MOUTH EVERY DAY, Disp: 30 capsule, Rfl: 0     Allergies:  Review of patient's allergies indicates:  No Known Allergies    Physical Exam      Vital Signs  Temp: 98.4 °F (36.9 °C)  Temp src: Oral  Pulse: 86  Resp: 16  SpO2: 96 %  BP: 110/76  BP Location: Right arm  Patient Position: Sitting  Pain Score: 0-No pain  Height and Weight  Height: 5' 7" (170.2 cm)  Weight: 88.8 kg (195 lb 12.3 oz)  BSA (Calculated - sq m): 2.05 sq meters  BMI (Calculated): 30.7  Weight in (lb) to have BMI = 25: 159.3      Patient Position: Sitting      Physical Exam  Vitals reviewed.   Constitutional:       General: He is not in acute distress.     Appearance: Normal appearance. He is not ill-appearing, toxic-appearing or diaphoretic.   HENT:      Head: Normocephalic and atraumatic.      Right Ear: Tympanic membrane normal.      Left Ear: Tympanic membrane normal.   Eyes:      Extraocular Movements: Extraocular movements intact.      Conjunctiva/sclera: Conjunctivae normal.      Pupils: Pupils are equal, round, and reactive to light.   Neck:      Vascular: No carotid bruit.   Cardiovascular:      Rate and Rhythm: Normal rate and regular rhythm.      Pulses: Normal pulses.      Heart sounds: Normal heart sounds. No murmur heard.  Pulmonary: "      Effort: No respiratory distress.      Breath sounds: Normal breath sounds. No wheezing.   Abdominal:      General: Bowel sounds are normal. There is no distension.      Palpations: Abdomen is soft.      Tenderness: There is no abdominal tenderness. There is no guarding or rebound.   Musculoskeletal:         General: Normal range of motion.   Skin:     General: Skin is warm.   Neurological:      General: No focal deficit present.      Mental Status: He is alert and oriented to person, place, and time.   Psychiatric:         Mood and Affect: Mood normal.         Behavior: Behavior normal.          Laboratory:  CBC:  Recent Labs   Lab 07/07/22  0938   WBC 8.39   RBC 6.64 H   Hemoglobin 12.6 L   Hematocrit 39.8 L   Platelets 184   MCV 60 L   MCH 19.0 L   MCHC 31.7 L       CMP:  Recent Labs   Lab 07/07/22  0938   Glucose 119 H   Calcium 9.4   Albumin 4.9   Total Protein 8.0   Sodium 140   Potassium 3.7   CO2 29   Chloride 103   BUN 18   Creatinine 0.74   Alkaline Phosphatase 57   ALT 30   AST 33   Total Bilirubin 0.9           URINALYSIS:  Recent Labs   Lab 07/07/22  1017   Color, UA Yellow   Specific Gravity, UA >=1.030 A   pH, UA 6.5   Protein, UA 1+ A   Bacteria Negative  Negative   Nitrite, UA Negative   Leukocytes, UA Negative   Urobilinogen, UA 1.0   Hyaline Casts, UA 1  1      A1C:  Recent Labs   Lab 07/11/22  1355   Hemoglobin A1C 5.1       Other:   Recent Labs   Lab 07/11/22  1355   Ferritin 143   Iron 65   Transferrin 230   TIBC 340   Saturated Iron 19 L           Assessment/Plan     Faheem Shankar is a 31 y.o.male with:    Normal physical exam, routine  - Performed today.  Reviewed recent Basic labs.  RTC in 1 yr for fu or sooner if needed. [place] for syncope neg.  Suspect was likely vasovagal syncope.     Anxiety  -     Ambulatory referral/consult to Primary Care Behavioral Health (Non-Opioids); Future; Expected date: 08/29/2022  - Refer to behavioral health program for counseling. Pt will alert me for  any concerns or worsening or Sx's.    Microcytic anemia  - Cont iron and repeat labs as scheduled.     Gastroesophageal reflux disease, unspecified whether esophagitis present  - Stable.  Cont current regimen.      Chronic conditions status updated as per HPI.  Other than changes above, cont current medications and maintain follow up with specialists.  Follow up in about 1 year (around 8/22/2023) for fu chronic issues or sooner if needed.      Yarely Echavarria MD  [facility] [place]

## 2022-08-22 NOTE — PROGRESS NOTES
CHW reached out to pt since when scheduling pt, his insurance showed a copay of $0 for virtual and $40 for office.  After appt was changed back to virtual, copay was $40 again.  I asked pt is office is still preferred, Changed to office visit with Dr. Lesa Posada, PhD on 09/02/22 at 3:15pm.

## 2022-08-22 NOTE — PROGRESS NOTES
Behavioral Health Community Health Worker  Initial Assessment  Completed by: Sallie Cruz     Date:  8/22/2022    Patient Enrollment in Behavioral Health Program:  · Patient verbalized understanding of Behavioral Health Integration services to include:  · Patient understands that CHW, LCSW, PharmD and consulting Psychiatrist are members of the care team working collaboratively with his/her primary care provider: Yes  · Patient understands that activation of their [facility] patient portal account is required for accessing the full scope of team services: Yes  · Patient understands that some counseling sessions may occur via video: Yes  · Clinic visits with the psychiatrist may be subject to a co-pay based on your insurance: Yes  · Patient consents to enroll in BHI program: Yes    Assessments     Single Item Health Literacy Scale:  · How often do you need to have someone help you read instructions, pamphlets or other written material from your doctor or pharmacy?: Never    Promis 10:  · Promis 10 Responses  · In general, would you say your health is: Very good  · In general, would you say your quality of life is: Excellent  · In general, how would you rate your physical health?: Very good  · In general, how would you rate your mental health, including your mood and your ability to think?: Good  · In general, how would you rate your satisfaction with your social activities and relationships?: Good  · In general, please rate how well you carry out your usual social activities and roles. (This includes activities at home, at work and in your community, and responsibilities as a parent, child, spouse, employee, friend, etc.): Good  · To what extent are you able to carry out your everyday physical activities such as walking, climbing stairs, carrying groceries, or moving a chair? : Completely  · How often have you been bothered by emotional problems such as feeling anxious, depressed or irritable?: Often  · In the past 7  "days, how would you rate your fatigue on average?: Mild  · In the past 7 days, on a scale of 0 to 10 (where 0 is no pain and 10 is the worst pain imaginable) how would you rate your pain on average?: 0  · Global Physical Health: 11  · Global Mental health Score: 15    Depression PHQ:  PHQ9 8/22/2022   Total Score 9         Generalized Anxiety Disorder 7-Item Scale:  GAD7 8/22/2022   1. Feeling nervous, anxious, or on edge? 2   2. Not being able to stop or control worrying? 2   3. Worrying too much about different things? 2   4. Trouble relaxing? 1   5. Being so restless that it is hard to sit still? 0   6. Becoming easily annoyed or irritable? 2   7. Feeling afraid as if something awful might happen? 0   8. If you checked off any problems, how difficult have these problems made it for you to do your work, take care of things at home, or get along with other people? 1   GERDA-7 Score 9       History     Social History     Socioeconomic History    Marital status:    Tobacco Use    Smoking status: Never Smoker    Smokeless tobacco: Never Used   Substance and Sexual Activity    Alcohol use: Never    Drug use: Never    Sexual activity: Yes     Partners: Male       Call Summary     Patient was referred to the BHI (Non-opioid) program by Primary Care Provider, Dr. Yarely Echavarria.  CHW contacted Faheem Shankar who reports depression and anxiety that limits his activities of daily living (ADLs).   Patient scored "9" on the PHQ9 and "9" on the GERDA 7. Based on these scores patient is eligible for the Behavioral Health Integration (Non-opioid) Program. CHW completed the intake and scheduled a virtual appointment for patient on 09/02/22 at 3:15pm with Dr. Lesa Posada, PhD, Psychology.         "

## 2022-09-01 ENCOUNTER — TELEPHONE (OUTPATIENT)
Dept: BEHAVIORAL HEALTH | Facility: CLINIC | Age: 32
End: 2022-09-01
Payer: COMMERCIAL

## 2022-09-01 NOTE — PROGRESS NOTES
CHW reached out to pt to remind him of office appointment with Dr. Lesa Posada, PhD, on tomorrow. Left VM of the appointment.

## 2022-09-02 ENCOUNTER — OFFICE VISIT (OUTPATIENT)
Dept: BEHAVIORAL HEALTH | Facility: CLINIC | Age: 32
End: 2022-09-02
Payer: COMMERCIAL

## 2022-09-02 DIAGNOSIS — F41.1 GAD (GENERALIZED ANXIETY DISORDER): ICD-10-CM

## 2022-09-02 PROCEDURE — 90791 PR PSYCHIATRIC DIAGNOSTIC EVALUATION: ICD-10-PCS | Mod: S$GLB,,, | Performed by: PSYCHOLOGIST

## 2022-09-02 PROCEDURE — 99999 PR PBB SHADOW E&M-EST. PATIENT-LVL I: CPT | Mod: PBBFAC,,, | Performed by: PSYCHOLOGIST

## 2022-09-02 PROCEDURE — 99999 PR PBB SHADOW E&M-EST. PATIENT-LVL I: ICD-10-PCS | Mod: PBBFAC,,, | Performed by: PSYCHOLOGIST

## 2022-09-02 PROCEDURE — 90791 PSYCH DIAGNOSTIC EVALUATION: CPT | Mod: S$GLB,,, | Performed by: PSYCHOLOGIST

## 2022-09-02 PROCEDURE — 3044F HG A1C LEVEL LT 7.0%: CPT | Mod: CPTII,S$GLB,, | Performed by: PSYCHOLOGIST

## 2022-09-02 PROCEDURE — 3044F PR MOST RECENT HEMOGLOBIN A1C LEVEL <7.0%: ICD-10-PCS | Mod: CPTII,S$GLB,, | Performed by: PSYCHOLOGIST

## 2022-09-02 NOTE — PROGRESS NOTES
"  Primary Care Behavioral Health Integration: Initial  Date:  9/2/2022  Referral Source:  Yarely Echavarria MD  Type of Visit:  In person  Length of Appointment: 45  .  History of Present Illness:  Faheem Shankar, a 31 y.o. male with history of Anxiety disorders; generalized anxiety disorder [F41.1] referred by Yarely Echavarria MD.  Patient was seen, examined and chart was reviewed.    Met with patient.     Patient states he has been struggling with feeling overwhelmed. He has a lot going on and states he is not handling it as effectively as he'd like. 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 He states he has no time for self care and feels burned out, but doesn't know how to prioritize his time. He feels like if he attempts to reclaim time for himself, he has to "neglect/ignore" someone else.    Current symptoms:  Depression: denies.  Anxiety: excessive worrying, restlessness, and muscle tension.  Sleep:  denies .  Octavia:  denies.  Psychosis: denies .    Risk assessment:  Patient reports no suicidal ideation  Patient reports no homicidal ideation  Patient reports no self-injurious behavior  Patient reports no violent behavior    Past Medical History:   Diagnosis Date    COVID-19 02/2021         Current Outpatient Medications:     ferrous sulfate (SLOW FE ORAL), Take by mouth. Take one tablet 3 times per week, Disp: , Rfl:     multivitamin capsule, Take 1 capsule by mouth once daily., Disp: 1 capsule, Rfl: 0    omeprazole (PRILOSEC) 20 MG capsule, TAKE 1 CAPSULE(20 MG) BY MOUTH EVERY DAY, Disp: 30 capsule, Rfl: 0    Psychiatric History:  Psychiatric Diagnosis:  Patient is not taking any mental health meds.  They are not interested in medication changes.  Previous Medication Trials: No   Previous Psychiatric Outpatient Treatment:  Yes - Patient participated in individual therapy briefly after [place]. " "States he did not like the therapist so stopped going after a few sessions  Previous Psychiatric Hospitalizations:  No  Previous Suicide Attempts:  No  History of Trauma:  Yes "childhood trauma", declined to elaborate  Access to a Firearm:  No    Substance Use History:  Tobacco/Nicotine:  No   Alcohol: social  Illicit Substances: No  Misuse of Prescription Medications:  No  Caffeine: No    Mental Status Exam  General Appearance:  unremarkable, age appropriate   Speech: normal tone, normal rate, normal pitch, normal volume      Level of Cooperation: cooperative      Thought Processes: normal and logical   Mood: anxious      Thought Content: normal, no suicidality, no homicidality, delusions, or paranoia   Affect: congruent and appropriate   Orientation: Oriented x3   Memory: recent >  intact   Attention Span & Concentration: intact   Fund of General Knowledge: intact and appropriate to age and level of education   Abstract Reasoning: Not assessed   Judgment & Insight: good     Language  intact                                           GAD7 8/22/2022   1. Feeling nervous, anxious, or on edge? 2   2. Not being able to stop or control worrying? 2   3. Worrying too much about different things? 2   4. Trouble relaxing? 1   5. Being so restless that it is hard to sit still? 0   6. Becoming easily annoyed or irritable? 2   7. Feeling afraid as if something awful might happen? 0   8. If you checked off any problems, how difficult have these problems made it for you to do your work, take care of things at home, or get along with other people? 1   GERDA-7 Score 9       Impression:   My diagnostic impression is Anxiety disorders; generalized anxiety disorder [F41.1].     Provisional Diagnosis:  1. GERDA (generalized anxiety disorder)         Treatment Goals and Plan: Initial appointment focused on gathering history, identifying treatment goals and developing a treatment plan.  Discussed prioritizing and boundary setting with family " members.     Anxiety: reducing negative automatic thoughts and reducing physical symptoms of anxiety, setting healthy boundaries, develop assertive communication skills    Future treatment will utilize CBT and Problem-solving Therapy.      Return to Clinic: 1 month

## 2022-09-07 ENCOUNTER — LAB VISIT (OUTPATIENT)
Dept: LAB | Facility: HOSPITAL | Age: 32
End: 2022-09-07
Attending: INTERNAL MEDICINE
Payer: COMMERCIAL

## 2022-09-07 DIAGNOSIS — E61.1 IRON DEFICIENCY: ICD-10-CM

## 2022-09-07 DIAGNOSIS — D50.9 MICROCYTIC ANEMIA: ICD-10-CM

## 2022-09-07 LAB
IRON SERPL-MCNC: 113 UG/DL (ref 45–160)
SATURATED IRON: 32 % (ref 20–50)
TOTAL IRON BINDING CAPACITY: 357 UG/DL (ref 250–450)
TRANSFERRIN SERPL-MCNC: 241 MG/DL (ref 200–375)

## 2022-09-07 PROCEDURE — 83021 HEMOGLOBIN CHROMOTOGRAPHY: CPT | Performed by: INTERNAL MEDICINE

## 2022-09-07 PROCEDURE — 83789 MASS SPECTROMETRY QUAL/QUAN: CPT | Performed by: INTERNAL MEDICINE

## 2022-09-07 PROCEDURE — 84466 ASSAY OF TRANSFERRIN: CPT | Performed by: INTERNAL MEDICINE

## 2022-09-07 PROCEDURE — 36415 COLL VENOUS BLD VENIPUNCTURE: CPT | Mod: PO | Performed by: INTERNAL MEDICINE

## 2022-09-12 LAB
FERRITIN SERPL-MCNC: 92 MCG/L (ref 24–336)
HEMOGLOBIN VARIANT BY MASS SPECTROMETRY: NORMAL
HGB A MFR BLD ELPH: 94.5 % (ref 95.8–98)
HGB A2 MFR BLD: 5.5 % (ref 2–3.3)
HGB A2+XXX MFR BLD ELPH: ABNORMAL %
HGB F MFR BLD: 0 % (ref 0–0.9)
HGB XXX MFR BLD ELPH: ABNORMAL %
PATH REV BLD -IMP: ABNORMAL
PROVIDER SIGNING NAME: ABNORMAL

## 2022-09-29 ENCOUNTER — TELEPHONE (OUTPATIENT)
Dept: BEHAVIORAL HEALTH | Facility: CLINIC | Age: 32
End: 2022-09-29
Payer: COMMERCIAL

## 2022-09-29 NOTE — PROGRESS NOTES
CHW reached out to pt to remind him of office appointment with Dr. Lesa Posada, PhD, Psychology on 9/30/22. No answer, left VM of the appointment.

## 2022-09-30 ENCOUNTER — OFFICE VISIT (OUTPATIENT)
Dept: BEHAVIORAL HEALTH | Facility: CLINIC | Age: 32
End: 2022-09-30
Payer: COMMERCIAL

## 2022-09-30 DIAGNOSIS — F41.1 GAD (GENERALIZED ANXIETY DISORDER): Primary | ICD-10-CM

## 2022-09-30 PROCEDURE — 90834 PR PSYCHOTHERAPY W/PATIENT, 45 MIN: ICD-10-PCS | Mod: S$GLB,,, | Performed by: PSYCHOLOGIST

## 2022-09-30 PROCEDURE — 3044F PR MOST RECENT HEMOGLOBIN A1C LEVEL <7.0%: ICD-10-PCS | Mod: CPTII,S$GLB,, | Performed by: PSYCHOLOGIST

## 2022-09-30 PROCEDURE — 90834 PSYTX W PT 45 MINUTES: CPT | Mod: S$GLB,,, | Performed by: PSYCHOLOGIST

## 2022-09-30 PROCEDURE — 3044F HG A1C LEVEL LT 7.0%: CPT | Mod: CPTII,S$GLB,, | Performed by: PSYCHOLOGIST

## 2022-09-30 NOTE — PROGRESS NOTES
"Primary Care Behavioral Health Integration  Behavioral Health Follow-up     Date: 09/30/2022    Site: [place], [place]    Type of Appointment: Individual, in person    Session Length:    CPT Code: 69367    Referred by: Yarely Echavarria MD    Patient consented to intervention in [facility].     [facility] Session Number:  2    CLINICAL HISTORY (ASSESS)  Presenting concern: stress, anxiety    Interval history and content of current session: Patient reported continued difficulty with boundary setting. He had a nice time on vacation but reported still having difficulty relaxing and "flipping out" about minor things throughout the trip.   Reviewed the stress response and relaxation response, SUDs scale, and discussed TIPP skills to assist with distress reduction.    MENTAL STATUS  Alert, oriented to person, place, time, and situation.   Good hygiene, adequately groomed. Maintains eye contact. Speech was within normal limits.   Behavior was cooperative.   Psychomotor activity was low.   Mood euthymic and   affect was congruent.   Thought process was logical and goal directed.   The patient denies suicidal or homicidal ideation.   There is no evidence of delusion. No current A/V/K hallucinations noted.   Remote memory recall is intact. Intellect estimated as average. Insight and judgment are moderate.    Impression/Diagnosis:    ICD-10-CM ICD-9-CM   1. GERDA (generalized anxiety disorder)  F41.1 300.02       Patient's goals for change (ADVISE & AGREE)    Patient expressed a desire to focus on stress management, boundary setting      Personal Action Plan (ASSIST)  SMART GOAL:  Practice TIPP skills once daily    Treatment Plan/Recommendations: (ARRANGE)  1. Continue individual sessions focused on   2. Patient is aware of crisis procedures  3. Patient denied the need for medication management at this time    Patient was provided with the opportunity to ask questions and is in agreement with the plan outlined above. "     Follow-Up Care:  Return to clinic: 10/7/22

## 2022-10-06 ENCOUNTER — TELEPHONE (OUTPATIENT)
Dept: BEHAVIORAL HEALTH | Facility: CLINIC | Age: 32
End: 2022-10-06
Payer: COMMERCIAL

## 2022-10-06 ENCOUNTER — PATIENT MESSAGE (OUTPATIENT)
Dept: BEHAVIORAL HEALTH | Facility: CLINIC | Age: 32
End: 2022-10-06
Payer: COMMERCIAL

## 2022-10-06 NOTE — PROGRESS NOTES
CHW reached out to pt to remind him of office appointment with Dr. Lesa Posada, PhD, Psychology on tomorrow. Pt confirmed the appointment.

## 2022-10-07 ENCOUNTER — OFFICE VISIT (OUTPATIENT)
Dept: BEHAVIORAL HEALTH | Facility: CLINIC | Age: 32
End: 2022-10-07
Payer: COMMERCIAL

## 2022-10-07 ENCOUNTER — TELEPHONE (OUTPATIENT)
Dept: BEHAVIORAL HEALTH | Facility: CLINIC | Age: 32
End: 2022-10-07
Payer: COMMERCIAL

## 2022-10-07 DIAGNOSIS — F41.1 GAD (GENERALIZED ANXIETY DISORDER): Primary | ICD-10-CM

## 2022-10-07 PROCEDURE — 90834 PR PSYCHOTHERAPY W/PATIENT, 45 MIN: ICD-10-PCS | Mod: S$GLB,,, | Performed by: PSYCHOLOGIST

## 2022-10-07 PROCEDURE — 3044F HG A1C LEVEL LT 7.0%: CPT | Mod: CPTII,S$GLB,, | Performed by: PSYCHOLOGIST

## 2022-10-07 PROCEDURE — 3044F PR MOST RECENT HEMOGLOBIN A1C LEVEL <7.0%: ICD-10-PCS | Mod: CPTII,S$GLB,, | Performed by: PSYCHOLOGIST

## 2022-10-07 PROCEDURE — 90834 PSYTX W PT 45 MINUTES: CPT | Mod: S$GLB,,, | Performed by: PSYCHOLOGIST

## 2022-10-07 NOTE — PROGRESS NOTES
Behavioral Health Community Health Worker  Follow-Up  Completed by:  Sallie Cruz     Date:  10/7/2022    Patient Enrollment in Behavioral Health Program:  Faheem Shankar was enrolled in the Behavioral Health Program on 08/22/2022    Assessments     Promis 10:  PROMIS-10 Questionnaire Scores 8/22/2022   Global Physical Health 11   Global Mental health Score 15       Depression PHQ 8 on 10/07/22: 5  PHQ9 8/22/2022   Total Score 9       Generalized Anxiety Disorder 7-Item Scale:  GAD7 10/6/2022   1. Feeling nervous, anxious, or on edge? 1   2. Not being able to stop or control worrying? 0   3. Worrying too much about different things? 1   4. Trouble relaxing? 1   5. Being so restless that it is hard to sit still? 1   6. Becoming easily annoyed or irritable? 1   7. Feeling afraid as if something awful might happen? 0   8. If you checked off any problems, how difficult have these problems made it for you to do your work, take care of things at home, or get along with other people? -   GERDA-7 Score 5       Patients' Global Impression of Change (PGIC) Scale:  Since beginning treatment at this clinic, how would you describe the change (if any) in ACTIVITY LIMITATIONS, SYMPTOMS, EMOTIONS, and OVERALL QUALITY OF LIFE, related to your painful condition?  No Value exists for the : OHS#15489      In a similar way, please check the number below that matches your degree of change since beginning care at this clinic (Much better (0) - Much Worse (10)): No Value exists for the : OHS#02689        Much Better                                     No Change                                    Much Worse                        -----------------------------------------------------------------------------                        0       1       2       3       4       5       6       7      8       9      10                     Call Summary     Patient updated his monthly assessments on 10/7/22 prior to office visit on the same day  with Dr. Lesa Posada, PhD, Psychology at 2:30pm.

## 2022-10-07 NOTE — PROGRESS NOTES
"Primary Care Behavioral Health Integration  Behavioral Health Follow-up     Date: 10/07/2022    Site: [place], [place]    Type of Appointment: Individual, in person    Session Length:    CPT Code: 36648    Referred by: Yarely Echavarria MD    Patient consented to intervention in [facility].     [facility] Session Number:  3    CLINICAL HISTORY (ASSESS)  Presenting concern: stress, anxiety    Interval history and content of current session: Patient discussed stress at work and with regard to his family. He discussed learning that his boss has been making several large errors at work but feels he cannot stand up to her. He also shared a traumatic event that happened to him in high school that he feels he cannot share with his parents, based on how they recently reacted to learning of something similar happening to someone else. He expressed how he has always felt like "second fiddle" after his sister, who is the holloway child. He stated she gets all of the quality time and he gets the complaining from their parents. Reviewed assertive communication tips and tips for healthy boundary setting.    MENTAL STATUS  Alert, oriented to person, place, time, and situation.   Good hygiene, adequately groomed. Maintains eye contact. Speech was within normal limits.   Behavior was cooperative.   Psychomotor activity was normal.  Mood euthymic and   affect was congruent.   Thought process was logical and goal directed.   The patient denies suicidal or homicidal ideation.   There is no evidence of delusion. No current A/V/K hallucinations noted.   Remote memory recall is intact. Intellect estimated as average. Insight and judgment are moderate.    Impression/Diagnosis:    ICD-10-CM ICD-9-CM   1. GERDA (generalized anxiety disorder)  F41.1 300.02         Patient's goals for change (ADVISE & AGREE)    Patient expressed a desire to focus on stress management, boundary setting      Personal Action Plan (ASSIST)  SMART GOAL:  paint " fence, complete tasks at home that he has been putting off, review assertiveness handout    Treatment Plan/Recommendations: (ARRANGE)  1. Continue individual sessions focused on boundary setting and stress management  2. Patient is aware of crisis procedures  3. Patient denied the need for medication management at this time    Patient was provided with the opportunity to ask questions and is in agreement with the plan outlined above.     Follow-Up Care:  Return to clinic: 10/21/22

## 2022-10-20 ENCOUNTER — TELEPHONE (OUTPATIENT)
Dept: BEHAVIORAL HEALTH | Facility: CLINIC | Age: 32
End: 2022-10-20
Payer: COMMERCIAL

## 2022-10-20 NOTE — PROGRESS NOTES
CHW reached out to pt to remind him of office appointment with Dr. Lesa Posada, PhD, Psychology tomorrow. Pt confirmed  the appointment.

## 2022-10-21 ENCOUNTER — TELEPHONE (OUTPATIENT)
Dept: BEHAVIORAL HEALTH | Facility: CLINIC | Age: 32
End: 2022-10-21
Payer: COMMERCIAL

## 2022-10-21 NOTE — PROGRESS NOTES
CHW received a call from pt that he cannot keep today's appt with Dr. Lesa Posada, PhD, Psychology due to a work related time conflict.  Pt is schedule for office visit on 11/4/22.

## 2022-11-03 ENCOUNTER — TELEPHONE (OUTPATIENT)
Dept: BEHAVIORAL HEALTH | Facility: CLINIC | Age: 32
End: 2022-11-03
Payer: COMMERCIAL

## 2022-11-03 ENCOUNTER — PATIENT MESSAGE (OUTPATIENT)
Dept: BEHAVIORAL HEALTH | Facility: CLINIC | Age: 32
End: 2022-11-03
Payer: COMMERCIAL

## 2022-11-03 NOTE — PROGRESS NOTES
CHW reached out to pt to see if office visit may be changed to a virtual visit on 11/4/22 at 2:30pm with Dr. Lesa Posada, PhD, Psychology. Left detailed voice message and sent portal message to please call me.    CHW reached out to pt, no answer, left detailed VM to pls call to change visit to virtual if possible or reschedule @ [phone].

## 2022-11-03 NOTE — PROGRESS NOTES
CHW received call from pt, changed appt to virtual visit for tomorrow at 2:30pm with Dr. Lesa Posada, PhD, Psychology.

## 2022-11-04 ENCOUNTER — PATIENT MESSAGE (OUTPATIENT)
Dept: BEHAVIORAL HEALTH | Facility: CLINIC | Age: 32
End: 2022-11-04

## 2022-11-04 ENCOUNTER — OFFICE VISIT (OUTPATIENT)
Dept: BEHAVIORAL HEALTH | Facility: CLINIC | Age: 32
End: 2022-11-04
Payer: COMMERCIAL

## 2022-11-04 DIAGNOSIS — F41.1 GAD (GENERALIZED ANXIETY DISORDER): Primary | ICD-10-CM

## 2022-11-04 PROCEDURE — 3044F PR MOST RECENT HEMOGLOBIN A1C LEVEL <7.0%: ICD-10-PCS | Mod: CPTII,95,, | Performed by: PSYCHOLOGIST

## 2022-11-04 PROCEDURE — 90834 PR PSYCHOTHERAPY W/PATIENT, 45 MIN: ICD-10-PCS | Mod: 95,,, | Performed by: PSYCHOLOGIST

## 2022-11-04 PROCEDURE — 90834 PSYTX W PT 45 MINUTES: CPT | Mod: 95,,, | Performed by: PSYCHOLOGIST

## 2022-11-04 PROCEDURE — 3044F HG A1C LEVEL LT 7.0%: CPT | Mod: CPTII,95,, | Performed by: PSYCHOLOGIST

## 2022-11-04 NOTE — PROGRESS NOTES
"Primary Care Behavioral Health Integration  Behavioral Health Follow-up     Date: 11/04/2022    Site: [place], [place]    Type of Appointment: Individual, vitual  The patient location is: [place], [place]  The chief complaint leading to consultation is: stress    Visit type: audiovisual    Face to Face time with patient: 45  50 minutes of total time spent on the encounter, which includes face to face time and non-face to face time preparing to see the patient (eg, review of tests), Obtaining and/or reviewing separately obtained history, Documenting clinical information in the electronic or other health record, Independently interpreting results (not separately reported) and communicating results to the patient/family/caregiver, or Care coordination (not separately reported).     Each patient to whom he or she provides medical services by telemedicine is:  (1) informed of the relationship between the physician and patient and the respective role of any other health care provider with respect to management of the patient; and (2) notified that he or she may decline to receive medical services by telemedicine and may withdraw from such care at any time.    Notes:       Session Length:    CPT Code: 83204    Referred by: Yarely Echavarria MD    Patient consented to intervention in [facility].     [facility] Session Number:  4    CLINICAL HISTORY (ASSESS)  Presenting concern: stress, anxiety    Interval history and content of current session: Patient discussed continued difficulty with boundary setting. He identified himself as a "people pleaser" and discussed all the ways he has recently put his own needs to the side in order to help others. He discussed stress eating and wanting to start exercising more. Reviewed SMART goal setting and habit building tips.    MENTAL STATUS  Alert, oriented to person, place, time, and situation.   Good hygiene, adequately groomed. Maintains eye contact. Speech was within normal " limits.   Behavior was cooperative.   Psychomotor activity was normal.  Mood euthymic and   affect was congruent.   Thought process was logical and goal directed.   The patient denies suicidal or homicidal ideation.   There is no evidence of delusion. No current A/V/K hallucinations noted.   Remote memory recall is intact. Intellect estimated as average. Insight and judgment are moderate.    Impression/Diagnosis:    ICD-10-CM ICD-9-CM   1. GERDA (generalized anxiety disorder)  F41.1 300.02       Patient's goals for change (ADVISE & AGREE)    Patient expressed a desire to focus on stress management, boundary setting      Personal Action Plan (ASSIST)  SMART GOAL:  exercise for 10 minutes a day    Treatment Plan/Recommendations: (ARRANGE)  1. Continue individual sessions focused on boundary setting and stress management  2. Patient is aware of crisis procedures  3. Patient denied the need for medication management at this time    Patient was provided with the opportunity to ask questions and is in agreement with the plan outlined above.     Follow-Up Care:  Return to clinic: 12/2/22

## 2022-11-21 PROBLEM — Z00.00 NORMAL PHYSICAL EXAM, ROUTINE: Status: RESOLVED | Noted: 2022-08-22 | Resolved: 2022-11-21

## 2022-12-01 ENCOUNTER — TELEPHONE (OUTPATIENT)
Dept: BEHAVIORAL HEALTH | Facility: CLINIC | Age: 32
End: 2022-12-01
Payer: COMMERCIAL

## 2022-12-01 ENCOUNTER — PATIENT MESSAGE (OUTPATIENT)
Dept: BEHAVIORAL HEALTH | Facility: CLINIC | Age: 32
End: 2022-12-01
Payer: COMMERCIAL

## 2022-12-01 NOTE — PROGRESS NOTES
CHW reached out to pt to remind him of office appointment with Dr. Lesa Posada, PhD, Psychology tomorrow at 8:00am. No answer, left VM. Sent pt portal message with assessments needed prior to visit.

## 2022-12-02 ENCOUNTER — PATIENT MESSAGE (OUTPATIENT)
Dept: BEHAVIORAL HEALTH | Facility: CLINIC | Age: 32
End: 2022-12-02
Payer: COMMERCIAL

## 2022-12-02 ENCOUNTER — TELEPHONE (OUTPATIENT)
Dept: BEHAVIORAL HEALTH | Facility: CLINIC | Age: 32
End: 2022-12-02
Payer: COMMERCIAL

## 2022-12-02 NOTE — PROGRESS NOTES
CHW reached out to pt to reschedule appointment with Dr. Lesa Posada, PhD, Psychology.  No answer, LVM. Sent patient portal message to pls call to reschedule with assessments for next visit.

## 2022-12-07 ENCOUNTER — TELEPHONE (OUTPATIENT)
Dept: BEHAVIORAL HEALTH | Facility: CLINIC | Age: 32
End: 2022-12-07
Payer: COMMERCIAL

## 2022-12-07 NOTE — PROGRESS NOTES
Behavioral Health Community Health Worker  Follow-Up  Completed by:  Sallie Cruz    Date:  12/7/2022    Patient Enrollment in Behavioral Health Program:  Faheem Shankar was enrolled in the Behavioral Health Program on 08/22/2022    Assessments     Promis 10:  PROMIS-10 Questionnaire Scores 8/22/2022   Global Physical Health 11   Global Mental health Score 15       Depression PHQ8 on 12/07/2022 = 17  PHQ9 8/22/2022   Total Score 9       Generalized Anxiety Disorder 7-Item Scale:  GAD7 12/7/2022   1. Feeling nervous, anxious, or on edge? 3   2. Not being able to stop or control worrying? 1   3. Worrying too much about different things? 1   4. Trouble relaxing? 1   5. Being so restless that it is hard to sit still? 1   6. Becoming easily annoyed or irritable? 1   7. Feeling afraid as if something awful might happen? 1   8. If you checked off any problems, how difficult have these problems made it for you to do your work, take care of things at home, or get along with other people? -   GERDA-7 Score 9       Patients' Global Impression of Change (PGIC) Scale:  Since beginning treatment at this clinic, how would you describe the change (if any) in ACTIVITY LIMITATIONS, SYMPTOMS, EMOTIONS, and OVERALL QUALITY OF LIFE, related to your painful condition?  No Value exists for the : OHS#92994      In a similar way, please check the number below that matches your degree of change since beginning care at this clinic (Much better (0) - Much Worse (10)): No Value exists for the : OHS#33788        Much Better                                     No Change                                    Much Worse                        -----------------------------------------------------------------------------                        0       1       2       3       4       5       6       7      8       9      10                     Call Summary     Patient was referred to the B[facility] (Non-opioid) program by Primary Care Provider,   Yarely Echavarria MD on 08/22/22.  On 12/07/2022, patient completed assessment via patient portal and rescheduled an office appointment on Friday, 01/13/2023 at 8:00am with Dr. Lesa Posada, PhD, Psychology. On PHQ8 patient scored a 17 and 9 on GAD7.

## 2023-01-05 ENCOUNTER — PATIENT MESSAGE (OUTPATIENT)
Dept: BEHAVIORAL HEALTH | Facility: CLINIC | Age: 33
End: 2023-01-05
Payer: COMMERCIAL

## 2023-01-05 ENCOUNTER — TELEPHONE (OUTPATIENT)
Dept: BEHAVIORAL HEALTH | Facility: CLINIC | Age: 33
End: 2023-01-05
Payer: COMMERCIAL

## 2023-01-05 NOTE — PROGRESS NOTES
CHW reached out to pt to offer earlier visit on 1/6/23 at 1:45pm virtual.  No answer, LVM. Sent pt portal message.

## 2023-01-12 ENCOUNTER — PATIENT MESSAGE (OUTPATIENT)
Dept: BEHAVIORAL HEALTH | Facility: CLINIC | Age: 33
End: 2023-01-12
Payer: COMMERCIAL

## 2023-01-12 ENCOUNTER — TELEPHONE (OUTPATIENT)
Dept: BEHAVIORAL HEALTH | Facility: CLINIC | Age: 33
End: 2023-01-12
Payer: COMMERCIAL

## 2023-01-12 NOTE — PROGRESS NOTES
CHW reached out to pt to remind him of office appointment with Dr. Lesa Posada, PhD, Psychology tomorrow. No answer, LVM, sent reminder and assessments to patient portal.

## 2023-01-13 ENCOUNTER — OFFICE VISIT (OUTPATIENT)
Dept: BEHAVIORAL HEALTH | Facility: CLINIC | Age: 33
End: 2023-01-13
Payer: COMMERCIAL

## 2023-01-13 ENCOUNTER — TELEPHONE (OUTPATIENT)
Dept: BEHAVIORAL HEALTH | Facility: CLINIC | Age: 33
End: 2023-01-13
Payer: COMMERCIAL

## 2023-01-13 DIAGNOSIS — F41.1 GAD (GENERALIZED ANXIETY DISORDER): Primary | ICD-10-CM

## 2023-01-13 PROCEDURE — 90834 PR PSYCHOTHERAPY W/PATIENT, 45 MIN: ICD-10-PCS | Mod: S$GLB,,, | Performed by: PSYCHOLOGIST

## 2023-01-13 PROCEDURE — 90834 PSYTX W PT 45 MINUTES: CPT | Mod: S$GLB,,, | Performed by: PSYCHOLOGIST

## 2023-01-13 NOTE — PROGRESS NOTES
Primary Care Behavioral Health Integration  Behavioral Health Follow-up     Date: 01/13/2023    Site: [place], [place]    Type of Appointment: Individual, in person    Session Length:  45  CPT Code: 42030    Referred by: Yarely Echavarria MD    Patient consented to intervention in [facility].     [facility] Session Number:  5    CLINICAL HISTORY (ASSESS)  Presenting concern: stress, anxiety    Interval history and content of current session: Patient shared he is turning in his two-week notice at work today. He is nervous but excited. He discussed how he feels the toxic environment has been contributing to his anxiety and emotional eating behaviors. He always thought he was stuck there because his boss made him feel like no one would ever hire him. However, he put out feelers around the holidays and was offered a position by three different people. He hasn't accepted any yet, but feels confident that he knows which one he will select. He will make more money and have to commute less. Discussed ways of staying calm in the discussion with his boss if things get heated, as he anticipates.       MENTAL STATUS  Alert, oriented to person, place, time, and situation.   Good hygiene, adequately groomed. Maintains eye contact. Speech was within normal limits.   Behavior was cooperative.   Psychomotor activity was normal.  Mood euthymic and   affect was congruent.   Thought process was logical and goal directed.   The patient denies suicidal or homicidal ideation.   There is no evidence of delusion. No current A/V/K hallucinations noted.   Remote memory recall is intact. Intellect estimated as average. Insight and judgment are moderate.    Impression/Diagnosis:    ICD-10-CM ICD-9-CM   1. GERDA (generalized anxiety disorder)  F41.1 300.02       Patient's goals for change (ADVISE & AGREE)    Patient expressed a desire to focus on stress management, boundary setting      Personal Action Plan (ASSIST)  SMART GOAL:  exercise for  10 minutes a day    Treatment Plan/Recommendations: (ARRANGE)  1. Continue individual sessions focused on boundary setting and stress management  2. Patient is aware of crisis procedures  3. Patient denied the need for medication management at this time    Patient was provided with the opportunity to ask questions and is in agreement with the plan outlined above.     Follow-Up Care:  Patient will transition is care to dept of psychiatry for additional f/u for emotional eating

## 2023-01-13 NOTE — PROGRESS NOTES
"Behavioral Health Community Health Worker  Follow-Up  Completed by:  Sallie Cruz     Date:  1/13/2023    Patient Enrollment in Behavioral Health Program:  Faheem Shankar was enrolled in the Behavioral Health Program on 08/22/2022    Assessments     Promis 10:  PROMIS-10 Questionnaire Scores 8/22/2022   Global Physical Health 11   Global Mental health Score 15       Depression PHQ8 on 01/12/2022 = 10  PHQ9 8/22/2022   Total Score 9       Generalized Anxiety Disorder 7-Item Scale:  GAD7 1/12/2023   1. Feeling nervous, anxious, or on edge? 1   2. Not being able to stop or control worrying? 1   3. Worrying too much about different things? 1   4. Trouble relaxing? 1   5. Being so restless that it is hard to sit still? 1   6. Becoming easily annoyed or irritable? 1   7. Feeling afraid as if something awful might happen? 0   8. If you checked off any problems, how difficult have these problems made it for you to do your work, take care of things at home, or get along with other people? -   GERDA-7 Score 6       Patients' Global Impression of Change (PGIC) Scale:  Since beginning treatment at this clinic, how would you describe the change (if any) in ACTIVITY LIMITATIONS, SYMPTOMS, EMOTIONS, and OVERALL QUALITY OF LIFE, related to your painful condition?  No Value exists for the : OHS#52831      In a similar way, please check the number below that matches your degree of change since beginning care at this clinic (Much better (0) - Much Worse (10)): No Value exists for the : OHS#28200        Much Better                                     No Change                                    Much Worse                        -----------------------------------------------------------------------------                        0       1       2       3       4       5       6       7      8       9      10                     Call Summary     Patient completed assessments on 01/12/2023.  PHQ8 score is "10" and GERDA 7 score is " ""6" on 1/12/2023.  Patient has an 8:00am visit with Dr. Lesa Posada, PhD, Psychology today. On 08/22/22 at intake, patient scored "9" on PHQ9 and "9" on GERDA 7.      "

## 2023-01-30 ENCOUNTER — OFFICE VISIT (OUTPATIENT)
Dept: PSYCHIATRY | Facility: CLINIC | Age: 33
End: 2023-01-30
Payer: COMMERCIAL

## 2023-01-30 ENCOUNTER — PATIENT MESSAGE (OUTPATIENT)
Dept: PSYCHIATRY | Facility: CLINIC | Age: 33
End: 2023-01-30

## 2023-01-30 DIAGNOSIS — R63.2 BINGE EATING: ICD-10-CM

## 2023-01-30 DIAGNOSIS — F41.1 GAD (GENERALIZED ANXIETY DISORDER): Primary | ICD-10-CM

## 2023-01-30 PROCEDURE — 90834 PR PSYCHOTHERAPY W/PATIENT, 45 MIN: ICD-10-PCS | Mod: 95,,, | Performed by: PSYCHOLOGIST

## 2023-01-30 PROCEDURE — 90834 PSYTX W PT 45 MINUTES: CPT | Mod: 95,,, | Performed by: PSYCHOLOGIST

## 2023-01-30 NOTE — PROGRESS NOTES
Individual Psychotherapy (PhD/LCSW)    1/30/2023    Site:  Telemed     The patient location is: [place], [place]  The chief complaint leading to consultation is: binge eating    Visit type: audiovisual    Face to Face time with patient: 39  45 minutes of total time spent on the encounter, which includes face to face time and non-face to face time preparing to see the patient (eg, review of tests), Obtaining and/or reviewing separately obtained history, Documenting clinical information in the electronic or other health record, Independently interpreting results (not separately reported) and communicating results to the patient/family/caregiver, or Care coordination (not separately reported).         Each patient to whom he or she provides medical services by telemedicine is:  (1) informed of the relationship between the physician and patient and the respective role of any other health care provider with respect to management of the patient; and (2) notified that he or she may decline to receive medical services by telemedicine and may withdraw from such care at any time.    Notes:     Therapeutic Intervention: Met with patient.  Outpatient - Insight oriented psychotherapy 45 min - CPT code 55032 and Outpatient - Behavior modifying psychotherapy 45 min - CPT code 92018    Chief complaint/reason for encounter: emotional eating     Interval history and content of current session: Patient completed session one of DBT Skills for Emotional Eating. How emotions and binge eating are linked was discussed. An overview of the biosocial theory of emotion dysregulation was presented, highlighting biological vulnerability and invalidating environments. Chain analysis of binge eating was reviewed and assigned for homework.     Treatment plan:  Target symptoms:  emotional eating/binge eating  Why chosen therapy is appropriate versus another modality: relevant to diagnosis, evidence based practice  Outcome monitoring methods: self-report,  checklist/rating scale  Therapeutic intervention type: insight oriented psychotherapy, behavior modifying psychotherapy    Risk parameters:  Patient reports no suicidal ideation  Patient reports no homicidal ideation  Patient reports no self-injurious behavior  Patient reports no violent behavior    Verbal deficits: None    Patient's response to intervention:  The patient's response to intervention is accepting.    Progress toward goals and other mental status changes:  The patient's progress toward goals is fair .    Diagnosis:     ICD-10-CM ICD-9-CM   1. GERDA (generalized anxiety disorder)  F41.1 300.02   2. Binge eating  R63.2 783.6       Plan:  individual psychotherapy    Return to clinic: 1 week    Length of Service (minutes): 45

## 2023-02-07 ENCOUNTER — OFFICE VISIT (OUTPATIENT)
Dept: PSYCHIATRY | Facility: CLINIC | Age: 33
End: 2023-02-07
Payer: COMMERCIAL

## 2023-02-07 DIAGNOSIS — F41.1 GAD (GENERALIZED ANXIETY DISORDER): Primary | ICD-10-CM

## 2023-02-07 DIAGNOSIS — R63.2 BINGE EATING: ICD-10-CM

## 2023-02-07 PROCEDURE — 90834 PSYTX W PT 45 MINUTES: CPT | Mod: 95,,, | Performed by: PSYCHOLOGIST

## 2023-02-07 PROCEDURE — 90834 PR PSYCHOTHERAPY W/PATIENT, 45 MIN: ICD-10-PCS | Mod: 95,,, | Performed by: PSYCHOLOGIST

## 2023-02-07 NOTE — PROGRESS NOTES
"Individual Psychotherapy (PhD/LCSW)    2/7/2023    Site:  Telemed     The patient location is: [place], [place]  The chief complaint leading to consultation is: binge eating    Visit type: audiovisual    Face to Face time with patient: 39  45 minutes of total time spent on the encounter, which includes face to face time and non-face to face time preparing to see the patient (eg, review of tests), Obtaining and/or reviewing separately obtained history, Documenting clinical information in the electronic or other health record, Independently interpreting results (not separately reported) and communicating results to the patient/family/caregiver, or Care coordination (not separately reported).         Each patient to whom he or she provides medical services by telemedicine is:  (1) informed of the relationship between the physician and patient and the respective role of any other health care provider with respect to management of the patient; and (2) notified that he or she may decline to receive medical services by telemedicine and may withdraw from such care at any time.    Notes:     Therapeutic Intervention: Met with patient.  Outpatient - Insight oriented psychotherapy 45 min - CPT code 72733 and Outpatient - Behavior modifying psychotherapy 45 min - CPT code 46771    Chief complaint/reason for encounter: emotional eating     Interval history and content of current session: Patient completed session two of DBT Skills for Emotional Eating, which focused on mindfulness skills. Psychoeducation on the "what" and "how" skills of mindfulness was provided. The three states of mind (reasonable, emotional, wise) were discussed. Patient was guided through several exercises to practice mindfulness and finding Gentile Mind. Patient will engage in at least one mindfulness activity daily and record progress for homework.    Patient appeared somewhat guarded and declined to share examples of how his emotion mind has led to problematic " behaviors in the past.    Treatment plan:  Target symptoms:  emotional eating/binge eating  Why chosen therapy is appropriate versus another modality: relevant to diagnosis, evidence based practice  Outcome monitoring methods: self-report, checklist/rating scale  Therapeutic intervention type: insight oriented psychotherapy, behavior modifying psychotherapy    Risk parameters:  Patient reports no suicidal ideation  Patient reports no homicidal ideation  Patient reports no self-injurious behavior  Patient reports no violent behavior    Verbal deficits: None    Patient's response to intervention:  The patient's response to intervention is accepting, guarded.    Progress toward goals and other mental status changes:  The patient's progress toward goals is fair .    Diagnosis:     ICD-10-CM ICD-9-CM   1. GERDA (generalized anxiety disorder)  F41.1 300.02   2. Binge eating  R63.2 783.6         Plan:  individual psychotherapy    Return to clinic: 1 week    Length of Service (minutes): 45

## 2023-03-07 ENCOUNTER — PATIENT MESSAGE (OUTPATIENT)
Dept: PSYCHIATRY | Facility: CLINIC | Age: 33
End: 2023-03-07
Payer: COMMERCIAL

## 2023-09-25 ENCOUNTER — ON-DEMAND VIRTUAL (OUTPATIENT)
Dept: URGENT CARE | Facility: CLINIC | Age: 33
End: 2023-09-25
Payer: COMMERCIAL

## 2023-09-25 DIAGNOSIS — R19.7 DIARRHEA, UNSPECIFIED TYPE: Primary | ICD-10-CM

## 2023-09-25 PROCEDURE — 99212 PR OFFICE/OUTPT VISIT, EST, LEVL II, 10-19 MIN: ICD-10-PCS | Mod: 95,,, | Performed by: NURSE PRACTITIONER

## 2023-09-25 PROCEDURE — 99212 OFFICE O/P EST SF 10 MIN: CPT | Mod: 95,,, | Performed by: NURSE PRACTITIONER

## 2023-09-25 NOTE — PROGRESS NOTES
Subjective:      Patient ID: Faheem Shankar is a 32 y.o. male.    Vitals:  vitals were not taken for this visit.     Chief Complaint: Diarrhea      Visit Type: TELE AUDIOVISUAL    Present with the patient at the time of consultation: TELEMED PRESENT WITH PATIENT: None    Past Medical History:   Diagnosis Date    COVID-19 02/2021     Past Surgical History:   Procedure Laterality Date    OTHER SURGICAL HISTORY Right     Thumb fracture     Review of patient's allergies indicates:  No Known Allergies  Current Outpatient Medications on File Prior to Visit   Medication Sig Dispense Refill    ferrous sulfate (SLOW FE ORAL) Take by mouth. Take one tablet 3 times per week      multivitamin capsule Take 1 capsule by mouth once daily. 1 capsule 0    omeprazole (PRILOSEC) 20 MG capsule TAKE 1 CAPSULE(20 MG) BY MOUTH EVERY DAY 90 capsule 1     No current facility-administered medications on file prior to visit.     Family History   Problem Relation Age of Onset    Depression Mother     OCD Mother     Thalassemia Mother     Diverticulitis Father     Thalassemia Sister     Diabetes Maternal Grandmother     Heart attack Maternal Grandmother 76    Thalassemia Maternal Grandmother     Thalassemia Paternal Grandmother            [facility] P[facility] Odvv Intake    9/25/2023  8:25 AM CDT - Filed by Patient   Describe your reason for todays visit Ongoing diarrhea for five days   What is your current physical address in the event of a medical emergency? [address] [facility] [address]   Are you able to take your vital signs? Yes   Systolic Blood Pressure:    Diastolic Blood Pressure:    Weight: 192   Height: 5.6   Pulse:    Temperature: 97.6   Respiration rate:    Pulse Oxygen:    Please attach any relevant images or files          Patient visiting from [place], [place], c/o diarrhea x 5 days. No report abdominal pain, fever, chills, N/V. No hematochezia.  Denies recent foreign travel or known sick contacts. Treated with immodium w improvement but  then symptoms returned.  Has upcoming PCP appt on 10/3        Constitution: Negative for appetite change, chills, fatigue, fever and unexpected weight change.   Neck: Negative for painful lymph nodes.   Cardiovascular:  Negative for chest pain, palpitations and sob on exertion.   Respiratory:  Negative for chest tightness, cough and shortness of breath.    Gastrointestinal:  Positive for diarrhea. Negative for abdominal pain, history of abdominal surgery, nausea, vomiting, constipation, bright red blood in stool, dark colored stools, rectal bleeding, rectal pain and hemorrhoids.   Genitourinary:  Negative for dysuria, frequency, urgency, flank pain and pelvic pain.   Musculoskeletal:  Negative for back pain.   Skin:  Negative for color change, pale and rash.   Hematologic/Lymphatic: Negative for swollen lymph nodes.        Objective:   The physical exam was conducted virtually.  Physical Exam   Constitutional: He is oriented to person, place, and time. No distress.   HENT:   Head: Normocephalic and atraumatic.   Mouth/Throat: Oropharynx is clear and moist and mucous membranes are normal.   Eyes: Conjunctivae are normal. No scleral icterus.   Pulmonary/Chest: Effort normal. No respiratory distress.   Musculoskeletal: Normal range of motion.         General: Normal range of motion.   Neurological: He is alert and oriented to person, place, and time.   Skin: Skin is not diaphoretic.   Psychiatric: His behavior is normal. Judgment and thought content normal.   Vitals reviewed.      Assessment:     1. Diarrhea, unspecified type        Plan:       Diarrhea, unspecified type                  There are no Patient Instructions on file for this visit.

## 2023-10-03 ENCOUNTER — OFFICE VISIT (OUTPATIENT)
Dept: PRIMARY CARE CLINIC | Facility: CLINIC | Age: 33
End: 2023-10-03
Payer: COMMERCIAL

## 2023-10-03 VITALS
BODY MASS INDEX: 30.27 KG/M2 | DIASTOLIC BLOOD PRESSURE: 64 MMHG | HEIGHT: 67 IN | HEART RATE: 68 BPM | WEIGHT: 192.88 LBS | OXYGEN SATURATION: 99 % | SYSTOLIC BLOOD PRESSURE: 108 MMHG | RESPIRATION RATE: 16 BRPM | TEMPERATURE: 98 F

## 2023-10-03 DIAGNOSIS — D56.3 BETA THALASSEMIA TRAIT: ICD-10-CM

## 2023-10-03 DIAGNOSIS — Z11.59 NEED FOR HEPATITIS C SCREENING TEST: ICD-10-CM

## 2023-10-03 DIAGNOSIS — F41.9 ANXIETY: ICD-10-CM

## 2023-10-03 DIAGNOSIS — K21.9 GASTROESOPHAGEAL REFLUX DISEASE, UNSPECIFIED WHETHER ESOPHAGITIS PRESENT: ICD-10-CM

## 2023-10-03 DIAGNOSIS — Z13.220 SCREENING FOR LIPOID DISORDERS: ICD-10-CM

## 2023-10-03 DIAGNOSIS — Z00.00 NORMAL PHYSICAL EXAM, ROUTINE: Primary | ICD-10-CM

## 2023-10-03 DIAGNOSIS — Z11.4 ENCOUNTER FOR SCREENING FOR HIV: ICD-10-CM

## 2023-10-03 DIAGNOSIS — E66.9 CLASS 1 OBESITY WITH BODY MASS INDEX (BMI) OF 30.0 TO 30.9 IN ADULT, UNSPECIFIED OBESITY TYPE, UNSPECIFIED WHETHER SERIOUS COMORBIDITY PRESENT: ICD-10-CM

## 2023-10-03 PROBLEM — E66.811 CLASS 1 OBESITY WITH BODY MASS INDEX (BMI) OF 30.0 TO 30.9 IN ADULT: Status: ACTIVE | Noted: 2023-10-03

## 2023-10-03 PROCEDURE — 3078F PR MOST RECENT DIASTOLIC BLOOD PRESSURE < 80 MM HG: ICD-10-PCS | Mod: CPTII,S$GLB,, | Performed by: INTERNAL MEDICINE

## 2023-10-03 PROCEDURE — 99999 PR PBB SHADOW E&M-EST. PATIENT-LVL V: ICD-10-PCS | Mod: PBBFAC,,, | Performed by: INTERNAL MEDICINE

## 2023-10-03 PROCEDURE — 3074F SYST BP LT 130 MM HG: CPT | Mod: CPTII,S$GLB,, | Performed by: INTERNAL MEDICINE

## 2023-10-03 PROCEDURE — 1160F RVW MEDS BY RX/DR IN RCRD: CPT | Mod: CPTII,S$GLB,, | Performed by: INTERNAL MEDICINE

## 2023-10-03 PROCEDURE — 1160F PR REVIEW ALL MEDS BY PRESCRIBER/CLIN PHARMACIST DOCUMENTED: ICD-10-PCS | Mod: CPTII,S$GLB,, | Performed by: INTERNAL MEDICINE

## 2023-10-03 PROCEDURE — 3008F BODY MASS INDEX DOCD: CPT | Mod: CPTII,S$GLB,, | Performed by: INTERNAL MEDICINE

## 2023-10-03 PROCEDURE — 3008F PR BODY MASS INDEX (BMI) DOCUMENTED: ICD-10-PCS | Mod: CPTII,S$GLB,, | Performed by: INTERNAL MEDICINE

## 2023-10-03 PROCEDURE — 99395 PR PREVENTIVE VISIT,EST,18-39: ICD-10-PCS | Mod: S$GLB,,, | Performed by: INTERNAL MEDICINE

## 2023-10-03 PROCEDURE — 99395 PREV VISIT EST AGE 18-39: CPT | Mod: S$GLB,,, | Performed by: INTERNAL MEDICINE

## 2023-10-03 PROCEDURE — 99999 PR PBB SHADOW E&M-EST. PATIENT-LVL V: CPT | Mod: PBBFAC,,, | Performed by: INTERNAL MEDICINE

## 2023-10-03 PROCEDURE — 3074F PR MOST RECENT SYSTOLIC BLOOD PRESSURE < 130 MM HG: ICD-10-PCS | Mod: CPTII,S$GLB,, | Performed by: INTERNAL MEDICINE

## 2023-10-03 PROCEDURE — 1159F PR MEDICATION LIST DOCUMENTED IN MEDICAL RECORD: ICD-10-PCS | Mod: CPTII,S$GLB,, | Performed by: INTERNAL MEDICINE

## 2023-10-03 PROCEDURE — 3078F DIAST BP <80 MM HG: CPT | Mod: CPTII,S$GLB,, | Performed by: INTERNAL MEDICINE

## 2023-10-03 PROCEDURE — 1159F MED LIST DOCD IN RCRD: CPT | Mod: CPTII,S$GLB,, | Performed by: INTERNAL MEDICINE

## 2023-10-03 NOTE — PROGRESS NOTES
"[facility] Note    Chief Complaint      Chief Complaint   Patient presents with    Annual Exam       History of Present Illness      Faheem Shankar is a 32 y.o.  M with GERD presents to fu well visit.   Last visit - 8/22/22    H/o Syncope - He tested neg for COVID at  on 7/6/22. He c/o feeling weak/tired, had a HA, hot and cold, and had an episode of nausea and emesis.  He then went into his barn to feed the horses for approx 10-15mins when he became nauseated and vomited then had a syncopal episode on 6/30/22.  He states that he was on the ground for approximately 15 minutes. Seen in ED -7/7/22 -  He received IVF and fioricet for HA.   He was not orthostatic. He gets tired and ESTRADA.+snores. No known apnea.  Echo - 7/29/22 - EF - 60%; PAP - 28 mmHG. Holter - 7/29/22- NSR. CXR - 7/11/22 - neg. No recent episodes.      Anxiety/Depression - Work has been stressful. He works in [employer] He plans to fu with a therapist. "I get anxious al the time but I can usually calm myself". He goes to the gym 2/wk x 45-60 minutes ans Pilates/1/wk and yoga 1/wk.        Microcytic anemia, Beta thalassemia Minor - H/H  -12.6/39.8. Pt has thalassemia in family. Pt currently off iron supplement - slow FE OTC 3 times/wk. Iron studies improved.   9/7/22 -Hb A2 is increased supportive of Beta Thalassemia minor (Beta Thalassemia trait).       GERD - Up and down. He takes Omeprazole prn. Rec avoid triggers if known such as spicy foods, fried foods, caffeine.  Rec not eating late at night ~ 2 hrs prior to bedtime.  Rec keep head of bed elevated.        Hyperglycemia -  HA1c - 5.1 - wnl.     Obesity - BMI - 30.21 - Goal wt is 175 lb. He exercises regularly and is working on a high protein low carb 1500 baljeet/d diet.  He has worked on portion control. He worries about his wt as dad has morbid obesity.      HCM - Flu - none;  Tdap - Due- will get from pharm;  PCV 13 - none;  PVX 23 - none;  Gardasil - none;  COVID - 19 Vaccine " (J&J) #1 7/9/21;  Hep C Screen - none - defers;  HIV Screen - none - defers; C-scope - none- due at 44 y.o.;  PSA - none; Prev PCP - me at [facility]; well visit - 10/3/23     Patient Care Team:  Yarely Echavarria MD as PCP - General (Internal Medicine)     Health Maintenance:  Immunization History   Administered Date(s) Administered    COVID-19, vector-nr, rS-Ad26, PF (Elder) 07/09/2021    Hepatitis B, Pediatric/Adolescent 03/17/2000, 06/23/2004, 04/04/2008    Meningococcal Conjugate (MCV4P) 04/04/2008    Td (ADULT) 06/23/2004    Tdap 04/04/2008, 10/03/2023    Varicella 05/08/1997, 04/04/2008      Health Maintenance   Topic Date Due    Hepatitis C Screening  Never done    Lipid Panel  Never done    TETANUS VACCINE  10/03/2033        Past Medical History:  Past Medical History:   Diagnosis Date    COVID-19 02/2021       Past Surgical History:   has a past surgical history that includes OTHER SURGICAL HISTORY (Right).    Family History:  family history includes Arthritis in his father, maternal grandmother, and paternal grandmother; Depression in his mother; Diabetes in his maternal grandmother; Diverticulitis in his father; Hearing loss in his paternal grandmother; Heart attack (age of onset: 76) in his maternal grandmother; Heart disease in his maternal grandmother; Mental illness in his mother; OCD in his mother; Stroke in his maternal grandfather and paternal grandmother; Thalassemia in his maternal grandmother, mother, paternal grandmother, and sister; Thyroid disease in his paternal grandmother; Vision loss in his maternal grandfather and paternal uncle.     Social History:  Social History     Tobacco Use    Smoking status: Never    Smokeless tobacco: Never   Substance Use Topics    Alcohol use: Yes     Alcohol/week: 1.0 standard drink of alcohol     Types: 1 Glasses of wine per week     Comment: General have a glass of wine maybe two in a week.    Drug use: Never       Review of Systems   Constitutional:   "Negative for activity change and unexpected weight change.        Having difficulty losing wt. He is dieting and exercising.    HENT:  Negative for hearing loss, rhinorrhea and trouble swallowing.    Eyes:  Negative for discharge and visual disturbance.   Respiratory:  Negative for chest tightness and wheezing.    Cardiovascular:  Negative for chest pain and palpitations.   Gastrointestinal:  Positive for diarrhea. Negative for blood in stool, constipation and vomiting.        Resolved. Had watery diarrhea x 7 days - one since 3 days ago. Of note - took a few days of Clinda. Will alert me if it recurs.    Endocrine: Negative for polydipsia and polyuria.   Genitourinary:  Negative for difficulty urinating, hematuria and urgency.   Musculoskeletal:  Negative for arthralgias, joint swelling and neck pain.   Neurological:  Negative for weakness and headaches.   Psychiatric/Behavioral:  Negative for confusion and dysphoric mood. The patient is nervous/anxious.         Medications:    Current Outpatient Medications:     Lactobacillus acidophilus (PROBIOTIC ORAL), Take by mouth Daily., Disp: , Rfl:     multivitamin capsule, Take 1 capsule by mouth once daily., Disp: 1 capsule, Rfl: 0    omeprazole (PRILOSEC) 20 MG capsule, TAKE 1 CAPSULE(20 MG) BY MOUTH EVERY DAY, Disp: 90 capsule, Rfl: 1    PSYLLIUM HUSK ORAL, Take 1,500 mg by mouth Daily., Disp: , Rfl:     diphth,pertus,acell,,tetanus (BOOSTRIX TDAP) 2.5-8-5 Lf-mcg-Lf/0.5mL Syrg injection, Inject into the muscle., Disp: 0.5 mL, Rfl: 0     Allergies:  Review of patient's allergies indicates:  No Known Allergies    Physical Exam      Vital Signs  Temp: 98.1 °F (36.7 °C)  Temp Source: Oral  Pulse: 68  Resp: 16  SpO2: 99 %  BP: 108/64  BP Location: Left arm  Patient Position: Sitting  Pain Score: 0-No pain  Height and Weight  Height: 5' 7" (170.2 cm)  Weight: 87.5 kg (192 lb 14.4 oz)  BSA (Calculated - sq m): 2.03 sq meters  BMI (Calculated): 30.2  Weight in (lb) to have BMI " = 25: 159.3      Patient Position: Sitting      Physical Exam  Vitals reviewed.   Constitutional:       General: He is not in acute distress.     Appearance: Normal appearance. He is obese. He is not ill-appearing, toxic-appearing or diaphoretic.   HENT:      Head: Normocephalic and atraumatic.      Right Ear: Tympanic membrane normal.      Left Ear: Tympanic membrane normal.      Mouth/Throat:      Mouth: Mucous membranes are moist.      Pharynx: No oropharyngeal exudate or posterior oropharyngeal erythema.   Eyes:      Extraocular Movements: Extraocular movements intact.      Conjunctiva/sclera: Conjunctivae normal.      Pupils: Pupils are equal, round, and reactive to light.   Neck:      Vascular: No carotid bruit.   Cardiovascular:      Rate and Rhythm: Normal rate and regular rhythm.      Pulses: Normal pulses.      Heart sounds: Normal heart sounds.   Pulmonary:      Effort: No respiratory distress.      Breath sounds: Normal breath sounds. No wheezing.   Abdominal:      General: Bowel sounds are normal. There is no distension.      Palpations: Abdomen is soft.      Tenderness: There is no abdominal tenderness. There is no guarding or rebound.   Musculoskeletal:         General: Normal range of motion.      Cervical back: Neck supple. No tenderness.   Skin:     General: Skin is warm and dry.   Neurological:      General: No focal deficit present.      Mental Status: He is alert and oriented to person, place, and time.   Psychiatric:         Mood and Affect: Mood normal.         Behavior: Behavior normal.          Laboratory:  CBC:  Recent Labs   Lab 07/07/22  0938   WBC 8.39   RBC 6.64 H   Hemoglobin 12.6 L   Hematocrit 39.8 L   Platelets 184   MCV 60 L   MCH 19.0 L   MCHC 31.7 L       CMP:  Recent Labs   Lab 07/07/22  0938   Glucose 119 H   Calcium 9.4   Albumin 4.9   Total Protein 8.0   Sodium 140   Potassium 3.7   CO2 29   Chloride 103   BUN 18   Creatinine 0.74   Alkaline Phosphatase 57   ALT 30   AST 33    Total Bilirubin 0.9     URINALYSIS:  Recent Labs   Lab 07/07/22  1017   Color, UA Yellow   Specific Gravity, UA >=1.030 A   pH, UA 6.5   Protein, UA 1+ A   Bacteria Negative  Negative   Nitrite, UA Negative   Leukocytes, UA Negative   Urobilinogen, UA 1.0   Hyaline Casts, UA 1  1        A1C:  Recent Labs   Lab 07/11/22  1355   Hemoglobin A1C 5.1       Other:   Recent Labs   Lab 07/11/22  1355 09/07/22  0839   Ferritin 143  --    Iron 65 113   Transferrin 230 241   TIBC 340 357   Saturated Iron 19 L 32           Assessment/Plan     Faheem Shankar is a 32 y.o.male with:    Normal physical exam, routine  -     CBC Auto Differential; Future; Expected date: 10/03/2023  -     Comprehensive Metabolic Panel; Future; Expected date: 10/03/2023  -     TSH; Future; Expected date: 10/03/2023  -     Hemoglobin A1C; Future; Expected date: 10/03/2023  - Performed today.  Will check Basic labs.  RTC in 1 yr for fu or sooner if needed    Anxiety  - Stable.  Cont current regimen.    Beta thalassemia trait  - Stable. Repeat CBC.     Gastroesophageal reflux disease, unspecified whether esophagitis present  - Stable.  Cont current regimen.    Class 1 obesity with body mass index (BMI) of 30.0 to 30.9 in adult, unspecified obesity type, unspecified whether serious comorbidity present  -     Ambulatory Referral/Consult to Nutrition Services - [facility]; Future; Expected date: 10/10/2023  - Rec diet and exercise as discussed for wt loss.   Refer to Nutrition.     Need for hepatitis C screening test  -     Hepatitis C Antibody; Future; Expected date: 10/03/2023    Screening for lipoid disorders  -     Lipid Panel; Future; Expected date: 10/03/2023    Encounter for screening for HIV  -     HIV 1/2 Ag/Ab (4th Gen); Future; Expected date: 10/03/2023         Chronic conditions status updated as per HPI.  Other than changes above, cont current medications and maintain follow up with specialists.  Follow up in about 1 year  (around 10/3/2024) for well visit or sooner if needed.      Yarely Echavarria MD  [facility]

## 2023-10-11 ENCOUNTER — PATIENT MESSAGE (OUTPATIENT)
Dept: PRIMARY CARE CLINIC | Facility: CLINIC | Age: 33
End: 2023-10-11
Payer: COMMERCIAL

## 2023-10-11 ENCOUNTER — LAB VISIT (OUTPATIENT)
Dept: LAB | Facility: HOSPITAL | Age: 33
End: 2023-10-11
Attending: INTERNAL MEDICINE
Payer: COMMERCIAL

## 2023-10-11 DIAGNOSIS — Z11.59 NEED FOR HEPATITIS C SCREENING TEST: ICD-10-CM

## 2023-10-11 DIAGNOSIS — Z13.220 SCREENING FOR LIPOID DISORDERS: ICD-10-CM

## 2023-10-11 DIAGNOSIS — Z11.4 ENCOUNTER FOR SCREENING FOR HIV: ICD-10-CM

## 2023-10-11 DIAGNOSIS — Z00.00 NORMAL PHYSICAL EXAM, ROUTINE: ICD-10-CM

## 2023-10-11 LAB
ALBUMIN SERPL BCP-MCNC: 4.5 G/DL (ref 3.5–5.2)
ALP SERPL-CCNC: 56 U/L (ref 55–135)
ALT SERPL W/O P-5'-P-CCNC: 22 U/L (ref 10–44)
ANION GAP SERPL CALC-SCNC: 10 MMOL/L (ref 8–16)
AST SERPL-CCNC: 24 U/L (ref 10–40)
BASOPHILS # BLD AUTO: 0.02 K/UL (ref 0–0.2)
BASOPHILS NFR BLD: 0.4 % (ref 0–1.9)
BILIRUB SERPL-MCNC: 0.7 MG/DL (ref 0.1–1)
BUN SERPL-MCNC: 16 MG/DL (ref 6–20)
CALCIUM SERPL-MCNC: 9.5 MG/DL (ref 8.7–10.5)
CHLORIDE SERPL-SCNC: 105 MMOL/L (ref 95–110)
CHOLEST SERPL-MCNC: 121 MG/DL (ref 120–199)
CHOLEST/HDLC SERPL: 3.2 {RATIO} (ref 2–5)
CO2 SERPL-SCNC: 25 MMOL/L (ref 23–29)
CREAT SERPL-MCNC: 0.8 MG/DL (ref 0.5–1.4)
DIFFERENTIAL METHOD: ABNORMAL
EOSINOPHIL # BLD AUTO: 0.1 K/UL (ref 0–0.5)
EOSINOPHIL NFR BLD: 2.5 % (ref 0–8)
ERYTHROCYTE [DISTWIDTH] IN BLOOD BY AUTOMATED COUNT: 18.1 % (ref 11.5–14.5)
EST. GFR  (NO RACE VARIABLE): >60 ML/MIN/1.73 M^2
ESTIMATED AVG GLUCOSE: 103 MG/DL (ref 68–131)
GLUCOSE SERPL-MCNC: 100 MG/DL (ref 70–110)
HBA1C MFR BLD: 5.2 % (ref 4–5.6)
HCT VFR BLD AUTO: 40.6 % (ref 40–54)
HCV AB SERPL QL IA: NORMAL
HDLC SERPL-MCNC: 38 MG/DL (ref 40–75)
HDLC SERPL: 31.4 % (ref 20–50)
HGB BLD-MCNC: 12.4 G/DL (ref 14–18)
HIV 1+2 AB+HIV1 P24 AG SERPL QL IA: NORMAL
IMM GRANULOCYTES # BLD AUTO: 0 K/UL (ref 0–0.04)
IMM GRANULOCYTES NFR BLD AUTO: 0 % (ref 0–0.5)
LDLC SERPL CALC-MCNC: 71.2 MG/DL (ref 63–159)
LYMPHOCYTES # BLD AUTO: 2.1 K/UL (ref 1–4.8)
LYMPHOCYTES NFR BLD: 36.8 % (ref 18–48)
MCH RBC QN AUTO: 18.8 PG (ref 27–31)
MCHC RBC AUTO-ENTMCNC: 30.5 G/DL (ref 32–36)
MCV RBC AUTO: 62 FL (ref 82–98)
MONOCYTES # BLD AUTO: 0.5 K/UL (ref 0.3–1)
MONOCYTES NFR BLD: 9.2 % (ref 4–15)
NEUTROPHILS # BLD AUTO: 2.9 K/UL (ref 1.8–7.7)
NEUTROPHILS NFR BLD: 51.1 % (ref 38–73)
NONHDLC SERPL-MCNC: 83 MG/DL
NRBC BLD-RTO: 0 /100 WBC
PLATELET # BLD AUTO: 216 K/UL (ref 150–450)
PMV BLD AUTO: ABNORMAL FL (ref 9.2–12.9)
POTASSIUM SERPL-SCNC: 3.8 MMOL/L (ref 3.5–5.1)
PROT SERPL-MCNC: 7.2 G/DL (ref 6–8.4)
RBC # BLD AUTO: 6.6 M/UL (ref 4.6–6.2)
SODIUM SERPL-SCNC: 140 MMOL/L (ref 136–145)
TRIGL SERPL-MCNC: 59 MG/DL (ref 30–150)
TSH SERPL DL<=0.005 MIU/L-ACNC: 1.07 UIU/ML (ref 0.4–4)
WBC # BLD AUTO: 5.57 K/UL (ref 3.9–12.7)

## 2023-10-11 PROCEDURE — 87389 HIV-1 AG W/HIV-1&-2 AB AG IA: CPT | Performed by: INTERNAL MEDICINE

## 2023-10-11 PROCEDURE — 83036 HEMOGLOBIN GLYCOSYLATED A1C: CPT | Performed by: INTERNAL MEDICINE

## 2023-10-11 PROCEDURE — 80061 LIPID PANEL: CPT | Performed by: INTERNAL MEDICINE

## 2023-10-11 PROCEDURE — 85025 COMPLETE CBC W/AUTO DIFF WBC: CPT | Performed by: INTERNAL MEDICINE

## 2023-10-11 PROCEDURE — 36415 COLL VENOUS BLD VENIPUNCTURE: CPT | Mod: PO | Performed by: INTERNAL MEDICINE

## 2023-10-11 PROCEDURE — 84443 ASSAY THYROID STIM HORMONE: CPT | Performed by: INTERNAL MEDICINE

## 2023-10-11 PROCEDURE — 80053 COMPREHEN METABOLIC PANEL: CPT | Performed by: INTERNAL MEDICINE

## 2023-10-11 PROCEDURE — 86803 HEPATITIS C AB TEST: CPT | Performed by: INTERNAL MEDICINE

## 2023-10-12 NOTE — PROGRESS NOTES
I sent pt a my chart message -  I reviewed your labs.  Your HIV screen and Hepatitis C screenings were both negative. Your thyroid functions are normal.  Your Cholesterol looked good in that your LDL (bad cholesterol) was low.  Your HDL (good cholesterol) was also low.  Exercise can help to increase this level.  Your kidney function and liver functions looked good.  You are remains slightly anemic which is stable for you.  Your Ha1c was normal at 5.2.  No further recommendations at this time.    Dr. COLBERT

## 2023-10-12 NOTE — TELEPHONE ENCOUNTER
Called and spoke to pt. He take align daily. No sick contacts. No blood in stool. It has been off and on. No N/V. No recent travel.  He took 2 doses of Clinda 2 wks ago. Stools not watery. If persists alert me     Dr. COLBERT

## 2023-10-12 NOTE — TELEPHONE ENCOUNTER
LOV /Annual 10/3/23  RTC     Spoke to the patient. The patient states he was able to review lab message. He has been experiencing episodes of diarrhea about 3 to 4 times a day after last visit, 10/3/23. Patient also states he will sporadically not have any diarrhea at all one day but he will be constipated all day. Patient is asking for recommendations to improve his symptoms.

## 2023-10-19 ENCOUNTER — NUTRITION (OUTPATIENT)
Dept: NUTRITION | Facility: CLINIC | Age: 33
End: 2023-10-19

## 2023-10-19 DIAGNOSIS — Z71.3 NUTRITIONAL COUNSELING: Primary | ICD-10-CM

## 2023-10-19 PROCEDURE — 99999 PR PBB SHADOW E&M-EST. PATIENT-LVL I: ICD-10-PCS | Mod: PBBFAC,,, | Performed by: DIETITIAN, REGISTERED

## 2023-10-19 PROCEDURE — S9470 NUTRITIONAL COUNSELING, DIET: HCPCS | Mod: CSM,S$GLB,, | Performed by: DIETITIAN, REGISTERED

## 2023-10-19 PROCEDURE — S9470 PR NUTRITIONAL COUNSELING, DIETITIAN 12 WEEK PACKAGE: ICD-10-PCS | Mod: CSM,S$GLB,, | Performed by: DIETITIAN, REGISTERED

## 2023-10-19 PROCEDURE — 99999 PR PBB SHADOW E&M-EST. PATIENT-LVL I: CPT | Mod: PBBFAC,,, | Performed by: DIETITIAN, REGISTERED

## 2023-10-19 NOTE — PROGRESS NOTES
"Nutrition Assessment    Visit Type: Self-Pay initial 12- week $575  Session Time:  2 Hours  Reason for MNT visit: Pt in for education and nutrition counseling regarding  desired fat loss and learning how to fuel his body to prevent disease .       Age: 32 y.o.  Wt: 192.7 lbs (128 .5 lbs lean body mass, 73.0 lbs skeletal muscle mass, 64.2 lbs fat mass, 33.3% body fat, visceral fat level 13)  RMR-  Wt Readings from Last 1 Encounters:   10/03/23 87.5 kg (192 lb 14.4 oz)     Ht:   Ht Readings from Last 1 Encounters:   10/03/23 5' 7" (1.702 m)     BMI:   BMI Readings from Last 1 Encounters:   10/03/23 30.21 kg/m²       Client states:  Pt reports that he has previously been thin his whole young adult life. Around 2020 he was 230 lbs, his highest weight he has been. He has been actively trying to lose weight over the past 2-3 years, seeing progress but recently noticed a plateau.     Medical History  Problem List             Resolved    Rhinopharyngitis         Anxiety         Microcytic anemia         Gastroesophageal reflux disease         Beta thalassemia trait         Class 1 obesity with body mass index (BMI) of 30.0 to 30.9 in adult         Need for hepatitis C screening test         Screening for lipoid disorders            Past Medical History:   Diagnosis Date    COVID-19 02/2021       Past Surgical History:   Procedure Laterality Date    OTHER SURGICAL HISTORY Right     Thumb fracture          Medications    Prior to Admission medications    Medication Sig Start Date End Date Taking? Authorizing Provider   diphth,pertus,acell,,tetanus (BOOSTRIX TDAP) 2.5-8-5 Lf-mcg-Lf/0.5mL Syrg injection Inject into the muscle. 10/3/23   Natalie López, PharmD   Lactobacillus acidophilus (PROBIOTIC ORAL) Take by mouth Daily.    Provider, Historical   multivitamin capsule Take 1 capsule by mouth once daily. 7/6/21   Yarely Echavarria MD   omeprazole (PRILOSEC) 20 MG capsule TAKE 1 CAPSULE(20 MG) BY MOUTH EVERY DAY 3/21/23   " Yarely Echavarria MD   PSYLLIUM HUSK ORAL Take 1,500 mg by mouth Daily.    Provider, Historical        Vitamins, Minerals, and/or Supplements:  MVI taking 2x per week, 3 psyillium husk capsules daily     Food Allergies or Intolerances:  pt has GERD, has not noticed specific trigger foods, he has medication he take PRN that works great     Social History    Marital status:      Social History     Tobacco Use    Smoking status: Never    Smokeless tobacco: Never   Substance Use Topics    Alcohol use: Yes     Alcohol/week: 1.0 standard drink of alcohol     Types: 1 Glasses of wine per week     Comment: General have a glass of wine maybe two in a week.     Current Alcohol use: 1-2 drinks win a week-- wine, aperol sprits or olde fashion    Lab Reports   Reviewed and noted    Lab Results   Component Value Date    TSH 1.070 10/11/2023    AST 24 10/11/2023    ALT 22 10/11/2023    HDL 38 (L) 10/11/2023    LDLCALC 71.2 10/11/2023    TRIG 59 10/11/2023    HGBA1C 5.2 10/11/2023    HGBA1C 5.1 07/11/2022         BP Readings from Last 1 Encounters:   10/03/23 108/64        24-hour Recall    Wake 7am/5:30am/6:00am/6:30am  Outside-- helping with animals-- horses, cats, dog, pigs  Coffee with milk and truvia  7am/8am- BF- Skip// scrambled eggs/ cottage cheese with turkey slices/ hummus and carrots  12pm- lunch from home- bag salad kit wit          Beverages      2 cups coffee in AM  3 cups unsweet tea  Water intake- not consistent, sometimes with meals and others none     LIFESTYLE FACTORS    Dinning out: 1-2 x per month    Meal preparation/shopping:    Self    Sleep: fair    Stress Level: high    Support System:  spouse and friends    Exercise Regimen: lightly active (low intensity, 1-3 days a week)      Diagnosis    Involuntary weight gain related to weight gain during COVID as evidenced by pt reported weight being as high a 230 lbs.      Intervention    Estimated Energy Requirements:   Calories: 1700  Protein: 130-150  g  Carbohydrates: 150-170 g  Total Fat: 55-65 g  Baseline for fluids: 95 fl ounces    Recommendations & Goals:  Patient goals and recommendations are tailored to the specific patient's needs, readiness to change, lifestyle, culture, skills, resources, & abilities. Strategies to help achieve these nutrition-related goals were discussed which can include but are not limited to SMART goal setting & mindful eating.     Aim for a minimum of 7 hours sleep   Exercise 60 minutes most days  Eat breakfast within 1-2 hours of waking up  Try not to skip any meals or snacks, not going more than 3-4 hours without eating   At each meal and snack, try to include a source of fiber + lean protein + healthy fat     Written Materials Provided  These resources are intended to assist the patient in making it easier to choose recommended options when eating out & to identify better-for-you brands at the grocery store:    Meal Planning Guide with recommendations discussed along with portion sizes and a customized meal plan   Fueling Well On-the-Go Food Guide  Eat Fit Shopping Guide  Lifestyle Nutrition Meal Guide  RD contact information        Monitoring/Evaluation    Monitor the following:  Weight  Sleep  Stress Management  Movement  Nutrient intake in reference to meal plan    Communicated with healthcare provider and documented plan for referral to appropriate agency/healthcare provider as needed    Supervising Physician: Ascencion Mendoza MD    Patient motivation, anticipated barriers, expected compliance: Patient is motivated and has verbalized understanding and intent to comply.     Comprehension: good     Follow-up: in 3-4 weeks

## 2023-10-31 NOTE — PATIENT INSTRUCTIONS
Name: Faheem Shankar   Date: 10/19/2023    Recommended daily energy requirements to reach your goals:  1700 Calories  130-150 grams Protein  150-170 grams Carbohydrates  55-65 grams Fat  95 ounces of fluid per day

## 2024-01-08 PROBLEM — Z13.220 SCREENING FOR LIPOID DISORDERS: Status: RESOLVED | Noted: 2023-10-03 | Resolved: 2024-01-08

## 2024-07-15 ENCOUNTER — PATIENT MESSAGE (OUTPATIENT)
Dept: PRIMARY CARE CLINIC | Facility: CLINIC | Age: 34
End: 2024-07-15
Payer: COMMERCIAL

## 2024-07-15 DIAGNOSIS — D56.3 BETA THALASSEMIA TRAIT: ICD-10-CM

## 2024-07-15 DIAGNOSIS — R39.11 URINARY HESITANCY: Primary | ICD-10-CM

## 2024-07-15 DIAGNOSIS — R10.9 FLANK PAIN: ICD-10-CM

## 2024-07-16 NOTE — TELEPHONE ENCOUNTER
Please sign orders so we can get him scheduled for labs/urine. Offered an appt for next week for ger

## 2024-07-16 NOTE — TELEPHONE ENCOUNTER
He can start with us.  We can order a Urine and if suspect a stone appropriate imaging. Order a UA and CBC, CMP. See when I can get him a visit.   Dr. COLBERT

## 2024-07-17 ENCOUNTER — LAB VISIT (OUTPATIENT)
Dept: LAB | Facility: HOSPITAL | Age: 34
End: 2024-07-17
Attending: INTERNAL MEDICINE
Payer: COMMERCIAL

## 2024-07-17 DIAGNOSIS — R39.11 URINARY HESITANCY: ICD-10-CM

## 2024-07-17 DIAGNOSIS — D56.3 BETA THALASSEMIA TRAIT: ICD-10-CM

## 2024-07-17 DIAGNOSIS — R10.9 FLANK PAIN: ICD-10-CM

## 2024-07-17 LAB
ALBUMIN SERPL BCP-MCNC: 4.3 G/DL (ref 3.5–5.2)
ALP SERPL-CCNC: 52 U/L (ref 55–135)
ALT SERPL W/O P-5'-P-CCNC: 42 U/L (ref 10–44)
ANION GAP SERPL CALC-SCNC: 8 MMOL/L (ref 8–16)
AST SERPL-CCNC: 23 U/L (ref 10–40)
BASOPHILS # BLD AUTO: 0.03 K/UL (ref 0–0.2)
BASOPHILS NFR BLD: 0.4 % (ref 0–1.9)
BILIRUB SERPL-MCNC: 0.6 MG/DL (ref 0.1–1)
BUN SERPL-MCNC: 23 MG/DL (ref 6–20)
CALCIUM SERPL-MCNC: 9.5 MG/DL (ref 8.7–10.5)
CHLORIDE SERPL-SCNC: 108 MMOL/L (ref 95–110)
CO2 SERPL-SCNC: 23 MMOL/L (ref 23–29)
COMPLEXED PSA SERPL-MCNC: 0.66 NG/ML (ref 0–4)
CREAT SERPL-MCNC: 0.9 MG/DL (ref 0.5–1.4)
DIFFERENTIAL METHOD BLD: ABNORMAL
EOSINOPHIL # BLD AUTO: 0.2 K/UL (ref 0–0.5)
EOSINOPHIL NFR BLD: 2.7 % (ref 0–8)
ERYTHROCYTE [DISTWIDTH] IN BLOOD BY AUTOMATED COUNT: 17.9 % (ref 11.5–14.5)
EST. GFR  (NO RACE VARIABLE): >60 ML/MIN/1.73 M^2
GLUCOSE SERPL-MCNC: 100 MG/DL (ref 70–110)
HCT VFR BLD AUTO: 40 % (ref 40–54)
HGB BLD-MCNC: 12.6 G/DL (ref 14–18)
IMM GRANULOCYTES # BLD AUTO: 0.01 K/UL (ref 0–0.04)
IMM GRANULOCYTES NFR BLD AUTO: 0.1 % (ref 0–0.5)
LYMPHOCYTES # BLD AUTO: 2.2 K/UL (ref 1–4.8)
LYMPHOCYTES NFR BLD: 33 % (ref 18–48)
MCH RBC QN AUTO: 19.3 PG (ref 27–31)
MCHC RBC AUTO-ENTMCNC: 31.5 G/DL (ref 32–36)
MCV RBC AUTO: 61 FL (ref 82–98)
MONOCYTES # BLD AUTO: 0.7 K/UL (ref 0.3–1)
MONOCYTES NFR BLD: 10.2 % (ref 4–15)
NEUTROPHILS # BLD AUTO: 3.6 K/UL (ref 1.8–7.7)
NEUTROPHILS NFR BLD: 53.6 % (ref 38–73)
NRBC BLD-RTO: 0 /100 WBC
PLATELET # BLD AUTO: 211 K/UL (ref 150–450)
PMV BLD AUTO: ABNORMAL FL (ref 9.2–12.9)
POTASSIUM SERPL-SCNC: 4.4 MMOL/L (ref 3.5–5.1)
PROT SERPL-MCNC: 7.3 G/DL (ref 6–8.4)
RBC # BLD AUTO: 6.54 M/UL (ref 4.6–6.2)
SODIUM SERPL-SCNC: 139 MMOL/L (ref 136–145)
WBC # BLD AUTO: 6.75 K/UL (ref 3.9–12.7)

## 2024-07-17 PROCEDURE — 80053 COMPREHEN METABOLIC PANEL: CPT | Performed by: INTERNAL MEDICINE

## 2024-07-17 PROCEDURE — 84153 ASSAY OF PSA TOTAL: CPT | Performed by: INTERNAL MEDICINE

## 2024-07-17 PROCEDURE — 85025 COMPLETE CBC W/AUTO DIFF WBC: CPT | Performed by: INTERNAL MEDICINE

## 2024-07-17 PROCEDURE — 36415 COLL VENOUS BLD VENIPUNCTURE: CPT | Mod: PO | Performed by: INTERNAL MEDICINE

## 2024-07-18 NOTE — PROGRESS NOTES
I sent pt a my chart message -  I reviewed your labs.  Your Prostate specific antigen was normal at 0.66. Your anemic is stable.  Your kidney and liver function looked good.   IT does appear you could increase your hydration.   Dr. COLBERT

## 2024-08-13 ENCOUNTER — HOSPITAL ENCOUNTER (OUTPATIENT)
Dept: RADIOLOGY | Facility: HOSPITAL | Age: 34
Discharge: HOME | End: 2024-08-13
Attending: INTERNAL MEDICINE
Payer: COMMERCIAL

## 2024-08-13 DIAGNOSIS — R10.9 ABDOMINAL PAIN, UNSPECIFIED ABDOMINAL LOCATION: ICD-10-CM

## 2024-08-13 PROCEDURE — 74178 CT ABD&PLV WO CNTR FLWD CNTR: CPT | Mod: TC,PO

## 2024-08-13 PROCEDURE — 25500020 PHARM REV CODE 255: Mod: PO | Performed by: INTERNAL MEDICINE

## 2024-08-13 PROCEDURE — 74178 CT ABD&PLV WO CNTR FLWD CNTR: CPT | Mod: 26,,, | Performed by: RADIOLOGY

## 2024-08-13 RX ADMIN — IOHEXOL 125 ML: 350 INJECTION, SOLUTION INTRAVENOUS at 03:08

## 2024-08-13 NOTE — PROGRESS NOTES
I sent pt a my chart message -  I reviewed your CT urogram - There is diffuse hepatic steatosis /Fatty liver. I rec you limit tylenol and alcohol.  Rec diet and exercise for wt loss.  There is a potential for cirrhosis. There is a 5 mm hypodensity present in the medial segment of the left hepatic lobe, most likely a cyst but too small to definitively characterize by CT. Rec MRI in 6 mos.  The spleen is enlarged measuring 16.7 cm. There is a synovial herniation pit/bone cyst present within the anterior aspect of the left femoral head-neck junction.  Dr. COLBERT

## 2025-07-22 DIAGNOSIS — K21.9 GASTROESOPHAGEAL REFLUX DISEASE, UNSPECIFIED WHETHER ESOPHAGITIS PRESENT: ICD-10-CM

## 2025-07-22 RX ORDER — OMEPRAZOLE 20 MG/1
20 CAPSULE, DELAYED RELEASE ORAL DAILY
Qty: 90 CAPSULE | Refills: 0 | Status: SHIPPED | OUTPATIENT
Start: 2025-07-22

## 2025-07-22 NOTE — TELEPHONE ENCOUNTER
No care due was identified.  [facility] Embedded Care Due Messages. Reference number: [id number].   7/22/2025 2:49:00 AM CDT

## 2025-07-22 NOTE — TELEPHONE ENCOUNTER
Call patient and spoke with him t get him schedule for a well visit on August 20,2025. Patient verbally confirmed appointment date and time.

## 2025-08-20 ENCOUNTER — OFFICE VISIT (OUTPATIENT)
Dept: PRIMARY CARE CLINIC | Facility: CLINIC | Age: 35
End: 2025-08-20
Payer: COMMERCIAL

## 2025-08-20 VITALS
RESPIRATION RATE: 14 BRPM | WEIGHT: 196.63 LBS | SYSTOLIC BLOOD PRESSURE: 128 MMHG | BODY MASS INDEX: 30.86 KG/M2 | HEIGHT: 67 IN | DIASTOLIC BLOOD PRESSURE: 78 MMHG | HEART RATE: 76 BPM | OXYGEN SATURATION: 98 %

## 2025-08-20 DIAGNOSIS — K76.0 FATTY LIVER: ICD-10-CM

## 2025-08-20 DIAGNOSIS — Z00.00 NORMAL PHYSICAL EXAM, ROUTINE: Primary | ICD-10-CM

## 2025-08-20 DIAGNOSIS — G43.109 MIGRAINE WITH AURA AND WITHOUT STATUS MIGRAINOSUS, NOT INTRACTABLE: ICD-10-CM

## 2025-08-20 DIAGNOSIS — D56.3 BETA THALASSEMIA TRAIT: ICD-10-CM

## 2025-08-20 DIAGNOSIS — K21.9 GASTROESOPHAGEAL REFLUX DISEASE, UNSPECIFIED WHETHER ESOPHAGITIS PRESENT: ICD-10-CM

## 2025-08-20 DIAGNOSIS — Z13.220 SCREENING FOR LIPOID DISORDERS: ICD-10-CM

## 2025-08-20 PROCEDURE — 99999 PR PBB SHADOW E&M-EST. PATIENT-LVL III: CPT | Mod: PBBFAC,,, | Performed by: INTERNAL MEDICINE

## 2025-08-20 PROCEDURE — 3008F BODY MASS INDEX DOCD: CPT | Mod: CPTII,S$GLB,, | Performed by: INTERNAL MEDICINE

## 2025-08-20 PROCEDURE — 99395 PREV VISIT EST AGE 18-39: CPT | Mod: S$GLB,,, | Performed by: INTERNAL MEDICINE

## 2025-08-20 PROCEDURE — 1159F MED LIST DOCD IN RCRD: CPT | Mod: CPTII,S$GLB,, | Performed by: INTERNAL MEDICINE

## 2025-08-20 PROCEDURE — 3074F SYST BP LT 130 MM HG: CPT | Mod: CPTII,S$GLB,, | Performed by: INTERNAL MEDICINE

## 2025-08-20 PROCEDURE — 3078F DIAST BP <80 MM HG: CPT | Mod: CPTII,S$GLB,, | Performed by: INTERNAL MEDICINE

## 2025-08-20 PROCEDURE — 1160F RVW MEDS BY RX/DR IN RCRD: CPT | Mod: CPTII,S$GLB,, | Performed by: INTERNAL MEDICINE

## 2025-08-20 RX ORDER — OMEPRAZOLE 20 MG/1
20 CAPSULE, DELAYED RELEASE ORAL DAILY
Qty: 90 CAPSULE | Refills: 3 | Status: SHIPPED | OUTPATIENT
Start: 2025-08-20

## 2025-08-26 ENCOUNTER — LAB VISIT (OUTPATIENT)
Dept: LAB | Facility: HOSPITAL | Age: 35
End: 2025-08-26
Attending: INTERNAL MEDICINE
Payer: COMMERCIAL